# Patient Record
Sex: MALE | Race: WHITE | Employment: FULL TIME | URBAN - METROPOLITAN AREA
[De-identification: names, ages, dates, MRNs, and addresses within clinical notes are randomized per-mention and may not be internally consistent; named-entity substitution may affect disease eponyms.]

---

## 2017-01-06 PROBLEM — M54.2 NECK PAIN: Status: ACTIVE | Noted: 2017-01-06

## 2017-04-13 ENCOUNTER — INITIAL CONSULT (OUTPATIENT)
Dept: NEUROSURGERY | Age: 37
End: 2017-04-13
Payer: COMMERCIAL

## 2017-04-13 VITALS
HEART RATE: 87 BPM | HEIGHT: 75 IN | BODY MASS INDEX: 29.72 KG/M2 | SYSTOLIC BLOOD PRESSURE: 132 MMHG | DIASTOLIC BLOOD PRESSURE: 84 MMHG | WEIGHT: 239 LBS

## 2017-04-13 DIAGNOSIS — M54.12 CERVICAL RADICULOPATHY AT C6: Primary | ICD-10-CM

## 2017-04-13 PROCEDURE — 99243 OFF/OP CNSLTJ NEW/EST LOW 30: CPT | Performed by: NEUROLOGICAL SURGERY

## 2017-04-13 RX ORDER — AMOXICILLIN 875 MG/1
TABLET, COATED ORAL
Refills: 0 | COMMUNITY
Start: 2017-04-08 | End: 2017-06-08 | Stop reason: ALTCHOICE

## 2017-04-13 RX ORDER — CARISOPRODOL 350 MG/1
350 TABLET ORAL 2 TIMES DAILY
COMMUNITY
End: 2017-06-08

## 2017-04-13 RX ORDER — METHYLPREDNISOLONE 4 MG/1
TABLET ORAL
Qty: 1 KIT | Refills: 0 | Status: SHIPPED | OUTPATIENT
Start: 2017-04-13 | End: 2017-04-19

## 2017-04-13 RX ORDER — FAMOTIDINE 20 MG/1
20 TABLET, FILM COATED ORAL 2 TIMES DAILY
Qty: 60 TABLET | Refills: 3 | Status: SHIPPED | OUTPATIENT
Start: 2017-04-13 | End: 2017-11-06

## 2017-04-13 ASSESSMENT — VISUAL ACUITY: VA_NORMAL: 1

## 2017-05-11 ENCOUNTER — OFFICE VISIT (OUTPATIENT)
Dept: NEUROSURGERY | Age: 37
End: 2017-05-11
Payer: COMMERCIAL

## 2017-05-11 VITALS
HEART RATE: 72 BPM | DIASTOLIC BLOOD PRESSURE: 90 MMHG | WEIGHT: 245 LBS | SYSTOLIC BLOOD PRESSURE: 122 MMHG | HEIGHT: 75 IN | BODY MASS INDEX: 30.46 KG/M2

## 2017-05-11 DIAGNOSIS — M54.12 CERVICAL RADICULOPATHY AT C6: Primary | ICD-10-CM

## 2017-05-11 PROCEDURE — 99213 OFFICE O/P EST LOW 20 MIN: CPT | Performed by: NEUROLOGICAL SURGERY

## 2017-05-11 ASSESSMENT — VISUAL ACUITY: VA_NORMAL: 1

## 2017-05-15 ENCOUNTER — HOSPITAL ENCOUNTER (OUTPATIENT)
Dept: PHYSICAL THERAPY | Age: 37
Setting detail: THERAPIES SERIES
Discharge: HOME | End: 2017-05-15
Payer: COMMERCIAL

## 2017-05-15 PROCEDURE — 97162 PT EVAL MOD COMPLEX 30 MIN: CPT

## 2017-05-17 ENCOUNTER — HOSPITAL ENCOUNTER (OUTPATIENT)
Dept: PHYSICAL THERAPY | Age: 37
Setting detail: THERAPIES SERIES
Discharge: HOME | End: 2017-05-17
Payer: COMMERCIAL

## 2017-05-17 PROCEDURE — 97110 THERAPEUTIC EXERCISES: CPT

## 2017-05-17 PROCEDURE — 97140 MANUAL THERAPY 1/> REGIONS: CPT

## 2017-05-17 ASSESSMENT — PAIN SCALES - GENERAL: PAINLEVEL_OUTOF10: 6

## 2017-05-17 ASSESSMENT — PAIN DESCRIPTION - ORIENTATION: ORIENTATION: LEFT

## 2017-05-17 ASSESSMENT — PAIN DESCRIPTION - LOCATION: LOCATION: NECK

## 2017-05-18 ASSESSMENT — PAIN SCALES - GENERAL: PAINLEVEL_OUTOF10: 7

## 2017-05-18 ASSESSMENT — PAIN DESCRIPTION - ORIENTATION: ORIENTATION: LEFT

## 2017-05-18 ASSESSMENT — PAIN DESCRIPTION - LOCATION: LOCATION: NECK

## 2017-05-22 ENCOUNTER — HOSPITAL ENCOUNTER (OUTPATIENT)
Dept: PHYSICAL THERAPY | Age: 37
Setting detail: THERAPIES SERIES
Discharge: HOME | End: 2017-05-22
Payer: COMMERCIAL

## 2017-05-22 PROCEDURE — 97140 MANUAL THERAPY 1/> REGIONS: CPT

## 2017-05-22 PROCEDURE — 97110 THERAPEUTIC EXERCISES: CPT

## 2017-05-22 ASSESSMENT — PAIN SCALES - GENERAL: PAINLEVEL_OUTOF10: 7

## 2017-05-22 ASSESSMENT — PAIN DESCRIPTION - LOCATION: LOCATION: NECK

## 2017-05-22 ASSESSMENT — PAIN DESCRIPTION - ORIENTATION: ORIENTATION: LEFT

## 2017-05-24 ENCOUNTER — HOSPITAL ENCOUNTER (OUTPATIENT)
Dept: PHYSICAL THERAPY | Age: 37
Setting detail: THERAPIES SERIES
Discharge: HOME | End: 2017-05-24
Payer: COMMERCIAL

## 2017-05-24 PROCEDURE — 97140 MANUAL THERAPY 1/> REGIONS: CPT

## 2017-05-24 PROCEDURE — 97110 THERAPEUTIC EXERCISES: CPT

## 2017-05-25 ASSESSMENT — PAIN DESCRIPTION - ORIENTATION: ORIENTATION: LEFT

## 2017-05-25 ASSESSMENT — PAIN DESCRIPTION - LOCATION: LOCATION: NECK

## 2017-05-25 ASSESSMENT — PAIN SCALES - GENERAL: PAINLEVEL_OUTOF10: 7

## 2017-05-31 ENCOUNTER — HOSPITAL ENCOUNTER (OUTPATIENT)
Dept: PHYSICAL THERAPY | Age: 37
Setting detail: THERAPIES SERIES
Discharge: HOME | End: 2017-05-31
Payer: COMMERCIAL

## 2017-06-05 ENCOUNTER — HOSPITAL ENCOUNTER (OUTPATIENT)
Dept: PHYSICAL THERAPY | Age: 37
Setting detail: THERAPIES SERIES
Discharge: HOME | End: 2017-06-05
Payer: COMMERCIAL

## 2017-06-07 ENCOUNTER — HOSPITAL ENCOUNTER (OUTPATIENT)
Dept: PHYSICAL THERAPY | Age: 37
Setting detail: THERAPIES SERIES
Discharge: HOME | End: 2017-06-07
Payer: COMMERCIAL

## 2017-06-07 PROCEDURE — 97110 THERAPEUTIC EXERCISES: CPT

## 2017-06-07 PROCEDURE — 97140 MANUAL THERAPY 1/> REGIONS: CPT

## 2017-06-08 ENCOUNTER — OFFICE VISIT (OUTPATIENT)
Dept: NEUROSURGERY | Age: 37
End: 2017-06-08
Payer: COMMERCIAL

## 2017-06-08 VITALS
DIASTOLIC BLOOD PRESSURE: 90 MMHG | SYSTOLIC BLOOD PRESSURE: 122 MMHG | HEART RATE: 81 BPM | HEIGHT: 75 IN | WEIGHT: 228 LBS | BODY MASS INDEX: 28.35 KG/M2

## 2017-06-08 DIAGNOSIS — M54.12 CERVICAL RADICULOPATHY: Primary | ICD-10-CM

## 2017-06-08 PROCEDURE — 99213 OFFICE O/P EST LOW 20 MIN: CPT | Performed by: NEUROLOGICAL SURGERY

## 2017-06-08 ASSESSMENT — PAIN DESCRIPTION - LOCATION: LOCATION: NECK

## 2017-06-08 ASSESSMENT — PAIN DESCRIPTION - ORIENTATION: ORIENTATION: LEFT

## 2017-06-08 ASSESSMENT — VISUAL ACUITY: VA_NORMAL: 1

## 2017-06-08 ASSESSMENT — PAIN SCALES - GENERAL: PAINLEVEL_OUTOF10: 7

## 2017-11-06 PROBLEM — R19.5 LOOSE STOOLS: Status: ACTIVE | Noted: 2017-11-06

## 2017-11-06 PROBLEM — N50.812 TESTICULAR PAIN, LEFT: Status: ACTIVE | Noted: 2017-11-06

## 2017-12-01 ENCOUNTER — HOSPITAL ENCOUNTER (OUTPATIENT)
Age: 37
Discharge: HOME | End: 2017-12-01
Payer: COMMERCIAL

## 2017-12-01 ENCOUNTER — OFFICE VISIT (OUTPATIENT)
Dept: GASTROENTEROLOGY | Age: 37
End: 2017-12-01
Payer: COMMERCIAL

## 2017-12-01 VITALS
SYSTOLIC BLOOD PRESSURE: 148 MMHG | HEIGHT: 75 IN | HEART RATE: 86 BPM | DIASTOLIC BLOOD PRESSURE: 97 MMHG | WEIGHT: 242 LBS | OXYGEN SATURATION: 96 % | BODY MASS INDEX: 30.09 KG/M2 | TEMPERATURE: 97.3 F

## 2017-12-01 DIAGNOSIS — R19.5 LOOSE STOOLS: ICD-10-CM

## 2017-12-01 DIAGNOSIS — R19.5 LOOSE STOOLS: Primary | ICD-10-CM

## 2017-12-01 LAB
ABSOLUTE EOS #: 0.6 K/UL (ref 0–0.4)
ABSOLUTE IMMATURE GRANULOCYTE: ABNORMAL K/UL (ref 0–0.3)
ABSOLUTE LYMPH #: 3 K/UL (ref 1–4.8)
ABSOLUTE MONO #: 0.7 K/UL (ref 0.1–1.3)
ALBUMIN SERPL-MCNC: 4.8 G/DL (ref 3.5–5.2)
ALBUMIN/GLOBULIN RATIO: ABNORMAL (ref 1–2.5)
ALP BLD-CCNC: 115 U/L (ref 40–129)
ALT SERPL-CCNC: 50 U/L (ref 5–41)
AST SERPL-CCNC: 35 U/L
BASOPHILS # BLD: 1 % (ref 0–2)
BASOPHILS ABSOLUTE: 0.1 K/UL (ref 0–0.2)
BILIRUB SERPL-MCNC: 0.34 MG/DL (ref 0.3–1.2)
BILIRUBIN DIRECT: 0.1 MG/DL
BILIRUBIN, INDIRECT: 0.24 MG/DL (ref 0–1)
DIFFERENTIAL TYPE: ABNORMAL
EOSINOPHILS RELATIVE PERCENT: 6 % (ref 0–4)
GLOBULIN: ABNORMAL G/DL (ref 1.5–3.8)
HCT VFR BLD CALC: 47.3 % (ref 41–53)
HEMOGLOBIN: 16.1 G/DL (ref 13.5–17.5)
IMMATURE GRANULOCYTES: ABNORMAL %
LYMPHOCYTES # BLD: 28 % (ref 24–44)
MCH RBC QN AUTO: 31.7 PG (ref 26–34)
MCHC RBC AUTO-ENTMCNC: 34.1 G/DL (ref 31–37)
MCV RBC AUTO: 92.7 FL (ref 80–100)
MONOCYTES # BLD: 7 % (ref 1–7)
PDW BLD-RTO: 13.9 % (ref 11.5–14.9)
PLATELET # BLD: 278 K/UL (ref 150–450)
PLATELET ESTIMATE: ABNORMAL
PMV BLD AUTO: 7.5 FL (ref 6–12)
RBC # BLD: 5.1 M/UL (ref 4.5–5.9)
RBC # BLD: ABNORMAL 10*6/UL
SEG NEUTROPHILS: 58 % (ref 36–66)
SEGMENTED NEUTROPHILS ABSOLUTE COUNT: 6.4 K/UL (ref 1.3–9.1)
TOTAL PROTEIN: 8.2 G/DL (ref 6.4–8.3)
WBC # BLD: 10.8 K/UL (ref 3.5–11)
WBC # BLD: ABNORMAL 10*3/UL

## 2017-12-01 PROCEDURE — 80076 HEPATIC FUNCTION PANEL: CPT

## 2017-12-01 PROCEDURE — 85025 COMPLETE CBC W/AUTO DIFF WBC: CPT

## 2017-12-01 PROCEDURE — 36415 COLL VENOUS BLD VENIPUNCTURE: CPT

## 2017-12-01 PROCEDURE — 83516 IMMUNOASSAY NONANTIBODY: CPT

## 2017-12-01 PROCEDURE — 99244 OFF/OP CNSLTJ NEW/EST MOD 40: CPT | Performed by: INTERNAL MEDICINE

## 2017-12-01 RX ORDER — POLYETHYLENE GLYCOL 3350 17 G/17G
POWDER, FOR SOLUTION ORAL
Qty: 255 G | Refills: 0 | Status: SHIPPED | OUTPATIENT
Start: 2017-12-01 | End: 2017-12-31

## 2017-12-01 ASSESSMENT — ENCOUNTER SYMPTOMS
COUGH: 0
BACK PAIN: 1
SHORTNESS OF BREATH: 0
WHEEZING: 0
ANAL BLEEDING: 1
ABDOMINAL PAIN: 0
DIARRHEA: 1
NAUSEA: 0
BLOOD IN STOOL: 0
SINUS PAIN: 1
RESPIRATORY NEGATIVE: 1
CONSTIPATION: 0
ABDOMINAL DISTENTION: 0
RECTAL PAIN: 0
TROUBLE SWALLOWING: 0
VOMITING: 0
EYES NEGATIVE: 1

## 2017-12-01 NOTE — PROGRESS NOTES
Subjective:      Patient ID: Rubi Laurent is a 40 y.o. male. HPI  Dr. Doris Romero MD our mutual patient Rubi Laurent was seen  for   1. Loose stools     . 2009 eoe  Loose bm6 months  Am  Rest day  No problemds . Gurgling. No bloating  Cramps  ? Blood no wt loss    Patient seen along with his fiancée. Patient states that he is having severe rectal pain, itching and hematochezia in the last 6 months. Noticing hematochezia intermittently. Usually with bowel movements. Has 2-3 liquid stools in the morning. Rest of the day he does not have bowel movements. He does not strain at bowel movements. No constipation. He also has abdominal gurgling sounds. Denies bloating. Has generalized cramps. Has a good appetite and there is no weight loss. He does have chronic GERD. Also he is having intermittent dysphagia with solids. However no symptom of complete obstruction of the esophagus recently. He denies taking NSAIDs. No history of anticoagulation therapy. This patient was seen by me last time in 2009. At that time he was diagnosed to have eosinophilic esophagitis and advised therapy with fluticasone and also allergy consultation. Patient did not follow this advice. He did not return for follow-up either. Past Medical, Family, and Social History reviewed and does contribute to the patient presenting condition. patient\"s PMH/PSH,SH,PSYCH hx, MEDs, ALLERGIES, and ROS was all reviewed and updated ion the appropriate sections    Review of Systems   Constitutional: Negative. Negative for fatigue. HENT: Positive for postnasal drip and sinus pain. Negative for congestion and trouble swallowing. Eyes: Negative. Negative for visual disturbance. Respiratory: Negative. Negative for cough, shortness of breath and wheezing. Cardiovascular: Negative. Negative for chest pain, palpitations and leg swelling. Gastrointestinal: Positive for anal bleeding and diarrhea.  Negative for

## 2017-12-04 LAB — TISSUE TRANSGLUTAMINASE IGA: 0.6 U/ML

## 2017-12-08 ENCOUNTER — HOSPITAL ENCOUNTER (OUTPATIENT)
Age: 37
Setting detail: OUTPATIENT SURGERY
Discharge: HOME | End: 2017-12-08
Attending: INTERNAL MEDICINE | Admitting: INTERNAL MEDICINE
Payer: COMMERCIAL

## 2017-12-08 VITALS
HEIGHT: 75 IN | SYSTOLIC BLOOD PRESSURE: 128 MMHG | TEMPERATURE: 97.5 F | DIASTOLIC BLOOD PRESSURE: 68 MMHG | HEART RATE: 79 BPM | BODY MASS INDEX: 28.35 KG/M2 | RESPIRATION RATE: 16 BRPM | OXYGEN SATURATION: 98 % | WEIGHT: 228 LBS

## 2017-12-08 PROCEDURE — 2580000003 HC RX 258: Performed by: INTERNAL MEDICINE

## 2017-12-08 PROCEDURE — 6360000002 HC RX W HCPCS: Performed by: INTERNAL MEDICINE

## 2017-12-08 PROCEDURE — 7100000011 HC PHASE II RECOVERY - ADDTL 15 MIN: Performed by: INTERNAL MEDICINE

## 2017-12-08 PROCEDURE — 88305 TISSUE EXAM BY PATHOLOGIST: CPT

## 2017-12-08 PROCEDURE — 3609010300 HC COLONOSCOPY W/BIOPSY SINGLE/MULTIPLE: Performed by: INTERNAL MEDICINE

## 2017-12-08 PROCEDURE — 99152 MOD SED SAME PHYS/QHP 5/>YRS: CPT | Performed by: INTERNAL MEDICINE

## 2017-12-08 PROCEDURE — 99153 MOD SED SAME PHYS/QHP EA: CPT | Performed by: INTERNAL MEDICINE

## 2017-12-08 PROCEDURE — 6370000000 HC RX 637 (ALT 250 FOR IP): Performed by: INTERNAL MEDICINE

## 2017-12-08 PROCEDURE — 7100000010 HC PHASE II RECOVERY - FIRST 15 MIN: Performed by: INTERNAL MEDICINE

## 2017-12-08 PROCEDURE — 3609012400 HC EGD TRANSORAL BIOPSY SINGLE/MULTIPLE: Performed by: INTERNAL MEDICINE

## 2017-12-08 RX ORDER — ONDANSETRON 2 MG/ML
4 INJECTION INTRAMUSCULAR; INTRAVENOUS ONCE
Status: COMPLETED | OUTPATIENT
Start: 2017-12-08 | End: 2017-12-08

## 2017-12-08 RX ORDER — MIDAZOLAM HYDROCHLORIDE 1 MG/ML
INJECTION INTRAMUSCULAR; INTRAVENOUS PRN
Status: DISCONTINUED | OUTPATIENT
Start: 2017-12-08 | End: 2017-12-08 | Stop reason: HOSPADM

## 2017-12-08 RX ORDER — SODIUM CHLORIDE 9 MG/ML
INJECTION, SOLUTION INTRAVENOUS CONTINUOUS
Status: DISCONTINUED | OUTPATIENT
Start: 2017-12-08 | End: 2017-12-08 | Stop reason: HOSPADM

## 2017-12-08 RX ORDER — FENTANYL CITRATE 50 UG/ML
INJECTION, SOLUTION INTRAMUSCULAR; INTRAVENOUS PRN
Status: DISCONTINUED | OUTPATIENT
Start: 2017-12-08 | End: 2017-12-08 | Stop reason: HOSPADM

## 2017-12-08 RX ADMIN — ONDANSETRON 4 MG: 2 INJECTION INTRAMUSCULAR; INTRAVENOUS at 13:25

## 2017-12-08 RX ADMIN — SODIUM CHLORIDE: 9 INJECTION, SOLUTION INTRAVENOUS at 10:05

## 2017-12-08 RX ADMIN — SODIUM CHLORIDE: 9 INJECTION, SOLUTION INTRAVENOUS at 13:20

## 2017-12-08 ASSESSMENT — PAIN - FUNCTIONAL ASSESSMENT: PAIN_FUNCTIONAL_ASSESSMENT: 0-10

## 2017-12-08 ASSESSMENT — PAIN DESCRIPTION - DESCRIPTORS: DESCRIPTORS: DULL;ACHING

## 2017-12-08 ASSESSMENT — PAIN SCALES - GENERAL: PAINLEVEL_OUTOF10: 0

## 2017-12-08 NOTE — OP NOTE
ESOPHAGOGASTRODUODENOSCOPY   ( EGD )  DATE OF PROCEDURE: 12/8/2017     SURGEON: Kim Mcneil MD    ASSISTANT: None    PREOPERATIVE DIAGNOSIS: Chronic GERD, intermittent dysphagia for solids. Procedure performed to evaluate esophageal pathology    POSTOPERATIVE DIAGNOSIS: Probable eosinophilic esophagitis no stricture seen    OPERATION: Upper GI endoscopy with Biopsy    ANESTHESIA: Moderate Sedation     ESTIMATED BLOOD LOSS: None    COMPLICATIONS: None. SPECIMENS:  Was Obtained: Esophagus    HISTORY: The patient is a 40y.o. year old male with history of above preop diagnosis. I recommended esophagogastroduodenoscopy with possible biopsy and I explained the risk, benefits, expected outcome, and alternatives to the procedure. Risks included but are not limited to bleeding, infection, respiratory distress, hypotension, and perforation of the esophagus, stomach, or duodenum. Patient understands and is in agreement. PROCEDURE: The patient was given IV conscious sedation. The patient's SPO2 remained above 90% throughout the procedure. Cetacaine spray given. Patient placed in left lateral position. Olympus  videogastroscope was inserted orally under vision into the esophagus without difficulty and advanced into the stomach then through the pylorus up to the second part of duodenum. Findings:    Retropharyngeal area was grossly normal appearing    Esophagus: abnormal: Concentric rings, long furrows seen. Some of these findings are suggestive of eosinophilic esophagitis. Multiple biopsies taken from the mid and upper esophagus. This is to evaluate possible eosinophilic esophagitis    Stomach:    Fundus and Cardia Examined in Retroflexed View: normal    Body: normal    Antrum: normal    Duodenum:     Descending: normal    Bulb: normal    While withdrawing the scope the above findings were verified and the scope was removed. The patient tolerated the procedure well. Recommendations/Plan:   1. F/U Biopsies  2. F/U In Office as instructed  3.  Discussed with the family    Electronically signed by Digna Styles MD  on 12/8/2017 at 12:10 PM

## 2017-12-08 NOTE — H&P
01/06/2017    Rectal bleeding 12/13/2016    Shoulder pain, left 06/30/2016    Anxiety 03/03/2014           CHILANGO MCCARTHY PA-C on 12/8/2017 at 10:36 AM

## 2017-12-11 LAB — SURGICAL PATHOLOGY REPORT: NORMAL

## 2018-01-17 DIAGNOSIS — R19.5 LOOSE STOOLS: ICD-10-CM

## 2018-01-18 PROBLEM — I10 ESSENTIAL HYPERTENSION: Status: ACTIVE | Noted: 2018-01-18

## 2018-02-08 ENCOUNTER — TELEPHONE (OUTPATIENT)
Dept: GASTROENTEROLOGY | Age: 38
End: 2018-02-08

## 2018-02-08 ENCOUNTER — OFFICE VISIT (OUTPATIENT)
Dept: GASTROENTEROLOGY | Age: 38
End: 2018-02-08
Payer: COMMERCIAL

## 2018-02-08 VITALS
HEIGHT: 75 IN | DIASTOLIC BLOOD PRESSURE: 88 MMHG | WEIGHT: 245.4 LBS | OXYGEN SATURATION: 95 % | SYSTOLIC BLOOD PRESSURE: 138 MMHG | BODY MASS INDEX: 30.51 KG/M2 | HEART RATE: 79 BPM

## 2018-02-08 DIAGNOSIS — K20.0 EOSINOPHILIC ESOPHAGITIS: ICD-10-CM

## 2018-02-08 DIAGNOSIS — R74.01 ELEVATED ALT MEASUREMENT: ICD-10-CM

## 2018-02-08 DIAGNOSIS — R13.14 PHARYNGOESOPHAGEAL DYSPHAGIA: ICD-10-CM

## 2018-02-08 PROCEDURE — 99214 OFFICE O/P EST MOD 30 MIN: CPT | Performed by: INTERNAL MEDICINE

## 2018-02-08 ASSESSMENT — ENCOUNTER SYMPTOMS
ABDOMINAL PAIN: 0
VOMITING: 0
GASTROINTESTINAL NEGATIVE: 1
CONSTIPATION: 0
SINUS PAIN: 1
TROUBLE SWALLOWING: 1
NAUSEA: 0
RHINORRHEA: 0
SINUS PRESSURE: 0
SHORTNESS OF BREATH: 0
EYES NEGATIVE: 1
COUGH: 0
SORE THROAT: 0
ABDOMINAL DISTENTION: 0
WHEEZING: 0
ANAL BLEEDING: 0
VOICE CHANGE: 0
BACK PAIN: 1
BLOOD IN STOOL: 0
RECTAL PAIN: 0
RESPIRATORY NEGATIVE: 1
FACIAL SWELLING: 0
DIARRHEA: 0

## 2018-02-08 NOTE — PROGRESS NOTES
Respiratory: Negative. Negative for cough, shortness of breath and wheezing. Cardiovascular: Negative. Negative for chest pain, palpitations and leg swelling. Gastrointestinal: Negative. Negative for abdominal distention, abdominal pain, anal bleeding, blood in stool, constipation, diarrhea, nausea, rectal pain and vomiting. Endocrine: Negative. Negative for polydipsia, polyphagia and polyuria. Genitourinary: Negative. Negative for difficulty urinating, dysuria, frequency and hematuria. Musculoskeletal: Positive for back pain and neck pain. Negative for arthralgias, gait problem, myalgias and neck stiffness. Allergic/Immunologic: Positive for food allergies (cashews and macadamia nuts). Negative for environmental allergies and immunocompromised state. Neurological: Positive for dizziness. Negative for tremors, seizures, syncope, facial asymmetry, speech difficulty, weakness, light-headedness, numbness and headaches. Hematological: Negative. Negative for adenopathy. Does not bruise/bleed easily. Psychiatric/Behavioral: Positive for sleep disturbance. The patient is nervous/anxious. Objective:   Physical Exam   Constitutional: He is oriented to person, place, and time. He appears well-developed and well-nourished. No distress. HENT:   Head: Normocephalic and atraumatic. Mouth/Throat: No oropharyngeal exudate. Eyes: Conjunctivae are normal. Pupils are equal, round, and reactive to light. No scleral icterus. Neck: Normal range of motion. Neck supple. No tracheal deviation present. No thyromegaly present. Cardiovascular: Normal rate, regular rhythm, normal heart sounds and intact distal pulses. No murmur heard. Pulmonary/Chest: Effort normal and breath sounds normal. No respiratory distress. He has no wheezes. He has no rales. Abdominal: Soft. Bowel sounds are normal. He exhibits no distension, no ascites and no mass. There is no hepatomegaly. There is no tenderness. There is no guarding. No hernia. No peripheral signs of ch. Liver disease   Genitourinary: Rectum normal.   Musculoskeletal: He exhibits no edema. Lymphadenopathy:     He has no cervical adenopathy. Neurological: He is alert and oriented to person, place, and time. No cranial nerve deficit. Skin: Skin is warm and dry. No rash noted. No erythema. Psychiatric: Thought content normal.   Nursing note and vitals reviewed. Assessment:      1. Eosinophilic esophagitis  LAURIE Shields MD, Yamilex Mahan MD   2. Pharyngoesophageal dysphagia     3. Elevated ALT measurement             Plan:      Patient is explained regarding the procedure findings and histology results. Given his a symptoms he needs further evaluation regarding food allergies. He agreed to see allergist and this is arranged. Following the allergy consultation we'll treat him for eosinophilic esophagitis. Antral then patient is advised to make food into small pieces, chew well and swallow. After discussion patient understood and agreed. As far as minimally elevated transaminases are concerned this needs to be followed at a later time basing repeating liver battery in 6 months.

## 2018-02-14 NOTE — TELEPHONE ENCOUNTER
Call Dr Mario Kapadia office speak to [facility], she states that patient has an appointment to see Dr Rae Rizo on 2/27/2018.

## 2018-02-27 ENCOUNTER — HOSPITAL ENCOUNTER (OUTPATIENT)
Age: 38
Discharge: HOME | End: 2018-02-27
Payer: COMMERCIAL

## 2018-02-27 LAB
ABSOLUTE EOS #: 0.6 K/UL (ref 0–0.4)
ABSOLUTE IMMATURE GRANULOCYTE: ABNORMAL K/UL (ref 0–0.3)
ABSOLUTE LYMPH #: 3 K/UL (ref 1–4.8)
ABSOLUTE MONO #: 0.7 K/UL (ref 0.1–1.3)
BASOPHILS # BLD: 0 % (ref 0–2)
BASOPHILS ABSOLUTE: 0 K/UL (ref 0–0.2)
DIFFERENTIAL TYPE: ABNORMAL
EOSINOPHILS ABSOLUTE: 550 /UL (ref 0–450)
EOSINOPHILS RELATIVE PERCENT: 5 %
EOSINOPHILS RELATIVE PERCENT: 5 % (ref 0–4)
HCT VFR BLD CALC: 46.4 % (ref 41–53)
HEMOGLOBIN: 15.9 G/DL (ref 13.5–17.5)
IGE: 62 IU/ML
IMMATURE GRANULOCYTES: ABNORMAL %
LYMPHOCYTES # BLD: 27 % (ref 24–44)
MCH RBC QN AUTO: 31.6 PG (ref 26–34)
MCHC RBC AUTO-ENTMCNC: 34.2 G/DL (ref 31–37)
MCV RBC AUTO: 92.6 FL (ref 80–100)
MONOCYTES # BLD: 6 % (ref 1–7)
NRBC AUTOMATED: ABNORMAL PER 100 WBC
PDW BLD-RTO: 13.6 % (ref 11.5–14.9)
PLATELET # BLD: 284 K/UL (ref 150–450)
PLATELET ESTIMATE: ABNORMAL
PMV BLD AUTO: 7 FL (ref 6–12)
RBC # BLD: 5.01 M/UL (ref 4.5–5.9)
RBC # BLD: ABNORMAL 10*6/UL
SEG NEUTROPHILS: 62 % (ref 36–66)
SEGMENTED NEUTROPHILS ABSOLUTE COUNT: 6.7 K/UL (ref 1.3–9.1)
WBC # BLD: 11 K/UL (ref 3.5–11)
WBC # BLD: 11 K/UL (ref 3.5–11)
WBC # BLD: ABNORMAL 10*3/UL

## 2018-02-27 PROCEDURE — 82785 ASSAY OF IGE: CPT

## 2018-02-27 PROCEDURE — 85025 COMPLETE CBC W/AUTO DIFF WBC: CPT

## 2018-02-27 PROCEDURE — 86003 ALLG SPEC IGE CRUDE XTRC EA: CPT

## 2018-02-27 PROCEDURE — 85048 AUTOMATED LEUKOCYTE COUNT: CPT

## 2018-02-27 PROCEDURE — 36415 COLL VENOUS BLD VENIPUNCTURE: CPT

## 2018-03-01 LAB
ALLERGEN ALMOND IGE: <0.34 KU/L (ref 0–0.34)
ALLERGEN CASHEW IGE: <0.34 KU/L (ref 0–0.34)
ALLERGEN CHICKEN IGE: <0.34 KU/L (ref 0–0.34)
ALLERGEN HAZELNUT: <0.34 KU/L (ref 0–0.34)
ALLERGEN PEANUT (F13) IGE: <0.34 KU/L (ref 0–0.34)
ALLERGEN PECAN NUT IGE: <0.34 KU/L (ref 0–0.34)
ALLERGEN PISTACHIO IGE: <0.34 KU/L (ref 0–0.34)
ALLERGEN SOYBEAN IGE: <0.34 KU/L (ref 0–0.34)
ALLERGEN WALNUT IGE: <0.34 KU/L (ref 0–0.34)
BRAZIL NUT IGE CLASS: <0.34 KU/L (ref 0–0.34)

## 2018-03-29 ENCOUNTER — OFFICE VISIT (OUTPATIENT)
Dept: GASTROENTEROLOGY | Age: 38
End: 2018-03-29
Payer: COMMERCIAL

## 2018-03-29 VITALS
SYSTOLIC BLOOD PRESSURE: 130 MMHG | BODY MASS INDEX: 31.36 KG/M2 | WEIGHT: 252.2 LBS | OXYGEN SATURATION: 98 % | DIASTOLIC BLOOD PRESSURE: 89 MMHG | HEIGHT: 75 IN | HEART RATE: 79 BPM

## 2018-03-29 DIAGNOSIS — R13.14 PHARYNGOESOPHAGEAL DYSPHAGIA: Primary | ICD-10-CM

## 2018-03-29 DIAGNOSIS — K20.0 EOSINOPHILIC ESOPHAGITIS: ICD-10-CM

## 2018-03-29 PROCEDURE — 99214 OFFICE O/P EST MOD 30 MIN: CPT | Performed by: INTERNAL MEDICINE

## 2018-03-29 RX ORDER — FLUTICASONE PROPIONATE 220 UG/1
AEROSOL, METERED RESPIRATORY (INHALATION)
Qty: 1 INHALER | Refills: 3 | Status: SHIPPED | OUTPATIENT
Start: 2018-03-29 | End: 2018-06-28 | Stop reason: ALTCHOICE

## 2018-03-29 RX ORDER — OMEPRAZOLE 40 MG/1
40 CAPSULE, DELAYED RELEASE ORAL DAILY
Qty: 30 CAPSULE | Refills: 3 | Status: SHIPPED | OUTPATIENT
Start: 2018-03-29 | End: 2018-10-16 | Stop reason: SDUPTHER

## 2018-03-29 RX ORDER — OMEPRAZOLE 40 MG/1
1 CAPSULE, DELAYED RELEASE ORAL DAILY
Refills: 0 | COMMUNITY
Start: 2018-02-27 | End: 2018-03-29 | Stop reason: SDUPTHER

## 2018-03-29 RX ORDER — FLUTICASONE PROPIONATE 220 UG/1
2 AEROSOL, METERED RESPIRATORY (INHALATION) DAILY
Refills: 0 | COMMUNITY
Start: 2018-02-27 | End: 2018-06-28 | Stop reason: ALTCHOICE

## 2018-03-29 RX ORDER — FLUTICASONE PROPIONATE 50 MCG
2 SPRAY, SUSPENSION (ML) NASAL NIGHTLY
Refills: 0 | COMMUNITY
Start: 2018-02-27 | End: 2019-09-07

## 2018-03-29 ASSESSMENT — ENCOUNTER SYMPTOMS
FACIAL SWELLING: 0
ANAL BLEEDING: 0
DIARRHEA: 0
SHORTNESS OF BREATH: 0
RECTAL PAIN: 0
ABDOMINAL PAIN: 0
RESPIRATORY NEGATIVE: 1
SINUS PAIN: 1
WHEEZING: 0
GASTROINTESTINAL NEGATIVE: 1
BACK PAIN: 1
VOICE CHANGE: 0
SORE THROAT: 0
SINUS PRESSURE: 0
COUGH: 0
CONSTIPATION: 0
RHINORRHEA: 0
TROUBLE SWALLOWING: 1
VOMITING: 0
ABDOMINAL DISTENTION: 0
BLOOD IN STOOL: 0
EYES NEGATIVE: 1
NAUSEA: 0

## 2018-06-28 PROBLEM — R53.83 TIREDNESS: Status: ACTIVE | Noted: 2018-06-28

## 2018-06-29 ENCOUNTER — HOSPITAL ENCOUNTER (OUTPATIENT)
Age: 38
Setting detail: SPECIMEN
Discharge: HOME | End: 2018-06-29
Payer: COMMERCIAL

## 2018-07-02 DIAGNOSIS — R53.83 TIREDNESS: ICD-10-CM

## 2018-07-06 ENCOUNTER — HOSPITAL ENCOUNTER (OUTPATIENT)
Age: 38
Setting detail: OUTPATIENT SURGERY
Discharge: HOME | End: 2018-07-06
Attending: INTERNAL MEDICINE | Admitting: INTERNAL MEDICINE
Payer: COMMERCIAL

## 2018-07-06 VITALS
TEMPERATURE: 97.5 F | HEART RATE: 77 BPM | RESPIRATION RATE: 17 BRPM | HEIGHT: 75 IN | DIASTOLIC BLOOD PRESSURE: 87 MMHG | WEIGHT: 240 LBS | OXYGEN SATURATION: 96 % | BODY MASS INDEX: 29.84 KG/M2 | SYSTOLIC BLOOD PRESSURE: 134 MMHG

## 2018-07-06 PROCEDURE — 3609012400 HC EGD TRANSORAL BIOPSY SINGLE/MULTIPLE: Performed by: INTERNAL MEDICINE

## 2018-07-06 PROCEDURE — 6370000000 HC RX 637 (ALT 250 FOR IP): Performed by: INTERNAL MEDICINE

## 2018-07-06 PROCEDURE — 2580000003 HC RX 258: Performed by: INTERNAL MEDICINE

## 2018-07-06 PROCEDURE — 6360000002 HC RX W HCPCS: Performed by: INTERNAL MEDICINE

## 2018-07-06 PROCEDURE — 88305 TISSUE EXAM BY PATHOLOGIST: CPT

## 2018-07-06 PROCEDURE — 7100000011 HC PHASE II RECOVERY - ADDTL 15 MIN: Performed by: INTERNAL MEDICINE

## 2018-07-06 PROCEDURE — 7100000010 HC PHASE II RECOVERY - FIRST 15 MIN: Performed by: INTERNAL MEDICINE

## 2018-07-06 PROCEDURE — 99152 MOD SED SAME PHYS/QHP 5/>YRS: CPT | Performed by: INTERNAL MEDICINE

## 2018-07-06 RX ORDER — FENTANYL CITRATE 50 UG/ML
INJECTION, SOLUTION INTRAMUSCULAR; INTRAVENOUS PRN
Status: DISCONTINUED | OUTPATIENT
Start: 2018-07-06 | End: 2018-07-06 | Stop reason: HOSPADM

## 2018-07-06 RX ORDER — SODIUM CHLORIDE 9 MG/ML
INJECTION, SOLUTION INTRAVENOUS CONTINUOUS
Status: DISCONTINUED | OUTPATIENT
Start: 2018-07-06 | End: 2018-07-06 | Stop reason: HOSPADM

## 2018-07-06 RX ORDER — MIDAZOLAM HYDROCHLORIDE 1 MG/ML
INJECTION INTRAMUSCULAR; INTRAVENOUS PRN
Status: DISCONTINUED | OUTPATIENT
Start: 2018-07-06 | End: 2018-07-06 | Stop reason: HOSPADM

## 2018-07-06 RX ADMIN — SODIUM CHLORIDE: 9 INJECTION, SOLUTION INTRAVENOUS at 10:39

## 2018-07-06 ASSESSMENT — PAIN - FUNCTIONAL ASSESSMENT: PAIN_FUNCTIONAL_ASSESSMENT: 0-10

## 2018-07-06 ASSESSMENT — PAIN SCALES - GENERAL: PAINLEVEL_OUTOF10: 0

## 2018-07-06 NOTE — H&P
HISTORY and Annmarie Ghosh 5747       NAME:  Mellie Phoenix  MRN: [id number]   YOB: 1980   Date: 7/6/2018   Age: 45 y.o. Gender: male       COMPLAINT AND PRESENT HISTORY:     Mellie Phoenix is 45 y.o.,   male, having an EGD. Eosinophilic esophagitis, Dysphagia. Patient denies any other GI symptoms. No nausea / vomiting, No diarrhea or constipation. No abdominal pains or cramping, No heartburn, no changes in the color of the stools. No fever or chills. PAST MEDICAL HISTORY     Past Medical History:   Diagnosis Date    History of kidney stones     Hypertension          SURGICAL HISTORY       Past Surgical History:   Procedure Laterality Date    COLONOSCOPY  12/08/2017    No lesions seen up to the cecum and a couple of inches of ileum. Random biopsies taken. , pathology--LARGE INTESTINAL TYPE MUCOSA DEMONSTRATING NO SIGNIFICANT     HERNIA REPAIR Left 07/27/2016    Dr Risa Ferguson COLONOSCOPY W/BIOPSY SINGLE/MULTIPLE N/A 12/8/2017    COLONOSCOPY WITH BIOPSY performed by Ted Deleon MD at [address] [place] [address] EGD TRANSORAL BIOPSY SINGLE/MULTIPLE N/A 12/8/2017    EGD BIOPSY performed by Ted Deleon MD at [facility]  05/26/2009    pathology-focal active esophagitis, showing prominent eosinophilia-up to 50 esonophils per high power field    UPPER GASTROINTESTINAL ENDOSCOPY  12/08/2017    Probable eosinophilic esophagitis no stricture seen, pathology-SQUAMOUS EPITHELIUM WITH EOSINOPHILIA (SEE COMMENT)          SOCIAL HISTORY       Social History     Social History    Marital status:      Spouse name: N/A    Number of children: N/A    Years of education: N/A     Social History Main Topics    Smoking status: Never Smoker    Smokeless tobacco: Current User     Types: Chew    Alcohol use No    Drug use: No    Sexual activity: Not Asked     Other Topics Concern    None     Social History Narrative  None           REVIEW OF SYSTEMS      Allergies   Allergen Reactions    Naproxen Nausea Only       No current facility-administered medications on file prior to encounter. Current Outpatient Prescriptions on File Prior to Encounter   Medication Sig Dispense Refill    fluticasone (FLONASE) 50 MCG/ACT nasal spray 2 sprays nightly  0    RA ALLERGY RELIEF 10 MG tablet 1 tablet daily  0    omeprazole (PRILOSEC) 40 MG delayed release capsule Take 1 capsule by mouth daily 30 capsule 3    losartan (COZAAR) 25 MG tablet Take 1 tablet by mouth daily 30 tablet 5       Negative except for what is mentioned in the HPI. GENERAL PHYSICAL EXAM     Vitals: BP (!) 144/90   Pulse 70   Temp 97.9 °F (36.6 °C) (Oral)   Resp 16   Ht 6' 3\" (1.905 m)   Wt 240 lb (108.9 kg)   SpO2 96%   BMI 30.00 kg/m²  Body mass index is 30 kg/m². GENERAL APPEARANCE:   Alondra Lynn is 45 y.o.,  male, mildly obese, nourished, conscious, alert. Does not appear to be distress or pain at this time. SKIN:  Warm, dry, no cyanosis or jaundice. HEAD:  Normocephalic, atraumatic, no swelling or tenderness. EYES:  Pupils equal, reactive to light. EARS:  No discharge, no marked hearing loss. NOSE:  No rhinorrhea, epistaxis or septal deformity. THROAT:  Not congested. No ulceration bleeding or discharge. NECK:  No stiffness, trachea central.  No palpable masses or L.N.                 CHEST:  Symmetrical and equal on expansion. HEART:  RRR S1 > S2. No audible murmurs or gallops. LUNGS:  Equal on expansion, normal breath sounds. No adventitious sounds. ABDOMEN:  Obese. Soft on palpation. No localized tenderness. No guarding or rigidity. No palpable organomegaly. LYMPHATICS:  No palpable cervical lymphadenopathy.      LOCOMOTOR, BACK AND SPINE:  No tenderness or deformities. EXTREMITIES:  Symmetrical, no pedal edema. Aminahs sign negative. No discoloration or ulcerations. NEUROLOGIC:  The patient is conscious, alert, oriented, No apparent focal sensory or motor deficits. PROVISIONAL DIAGNOSES / SURGERY:      EGD. Eosinophilic esophagitis, Dysphagia. Patient Active Problem List    Diagnosis Date Noted    Tiredness 48/45/0067    Eosinophilic esophagitis 14/71/3490    Pharyngoesophageal dysphagia 02/08/2018    Elevated ALT measurement 02/08/2018    Essential hypertension 01/18/2018    Loose stools 11/06/2017    Testicular pain, left 11/06/2017    Neck pain 01/06/2017    Rectal bleeding 12/13/2016    Shoulder pain, left 06/30/2016    Anxiety 03/03/2014         ASA Classification:  Class 2 - A normal healthy patient with mild systemic disease    Mallampati Airway Assessment:  Mallampati Class II - (soft palate, fauces & uvula are visible)    I have examined this patient and reviewed the history and physical, vital signs, current medications and review of systems.     Electronically signed by Brianna Valdivia MD on 7/6/2018 at 11:28 AM      Berto Campos PA-C on 7/6/2018 at 10:45 AM

## 2018-07-06 NOTE — OP NOTE
the above findings were verified and the scope was removed. The patient has tolerated the procedure and conscious sedation without unusual events. Recommendations/Plan:   1. F/U Biopsies  2. F/U In Office as instructed  3.  Discussed with the family                   Electronically signed by Breanna Gipson MD  on 7/6/2018 at 11:46 AM

## 2018-07-09 LAB — SURGICAL PATHOLOGY REPORT: NORMAL

## 2018-07-10 ENCOUNTER — HOSPITAL ENCOUNTER (OUTPATIENT)
Age: 38
Setting detail: SPECIMEN
Discharge: HOME | End: 2018-07-10
Payer: COMMERCIAL

## 2018-07-10 LAB
ALBUMIN SERPL-MCNC: 4.2 G/DL (ref 3.5–5.2)
ALBUMIN/GLOBULIN RATIO: 1.6 (ref 1–2.5)
ALP BLD-CCNC: 137 U/L (ref 40–129)
ALT SERPL-CCNC: 25 U/L (ref 5–41)
ANION GAP SERPL CALCULATED.3IONS-SCNC: 15 MMOL/L (ref 9–17)
AST SERPL-CCNC: 18 U/L
BILIRUB SERPL-MCNC: 0.3 MG/DL (ref 0.3–1.2)
BILIRUBIN DIRECT: 0.08 MG/DL
BILIRUBIN, INDIRECT: 0.22 MG/DL (ref 0–1)
BUN BLDV-MCNC: 12 MG/DL (ref 6–20)
CALCIUM SERPL-MCNC: 9 MG/DL (ref 8.6–10.4)
CHLORIDE BLD-SCNC: 105 MMOL/L (ref 98–107)
CO2: 24 MMOL/L (ref 20–31)
CREAT SERPL-MCNC: 0.81 MG/DL (ref 0.7–1.2)
GFR AFRICAN AMERICAN: >60 ML/MIN
GFR NON-AFRICAN AMERICAN: >60 ML/MIN
GFR SERPL CREATININE-BSD FRML MDRD: ABNORMAL ML/MIN/{1.73_M2}
GFR SERPL CREATININE-BSD FRML MDRD: ABNORMAL ML/MIN/{1.73_M2}
GLUCOSE BLD-MCNC: 89 MG/DL (ref 70–99)
HCT VFR BLD CALC: 44.3 % (ref 40.7–50.3)
HEMOGLOBIN: 14.8 G/DL (ref 13–17)
MCH RBC QN AUTO: 31 PG (ref 25.2–33.5)
MCHC RBC AUTO-ENTMCNC: 33.4 G/DL (ref 28.4–34.8)
MCV RBC AUTO: 92.7 FL (ref 82.6–102.9)
MONONUCLEOSIS SCREEN: NEGATIVE
NRBC AUTOMATED: 0 PER 100 WBC
PDW BLD-RTO: 12.9 % (ref 11.8–14.4)
PLATELET # BLD: 253 K/UL (ref 138–453)
PMV BLD AUTO: 10.1 FL (ref 8.1–13.5)
POTASSIUM SERPL-SCNC: 4 MMOL/L (ref 3.7–5.3)
RBC # BLD: 4.78 M/UL (ref 4.21–5.77)
SODIUM BLD-SCNC: 144 MMOL/L (ref 135–144)
THYROXINE, FREE: 1.15 NG/DL (ref 0.93–1.7)
TOTAL PROTEIN: 6.9 G/DL (ref 6.4–8.3)
TSH SERPL DL<=0.05 MIU/L-ACNC: 1.62 MIU/L (ref 0.3–5)
VITAMIN B-12: 523 PG/ML (ref 232–1245)
VITAMIN D 25-HYDROXY: 15 NG/ML (ref 30–100)
WBC # BLD: 12.1 K/UL (ref 3.5–11.3)

## 2018-07-13 LAB — EBV NUCLEAR AG AB: 109 U/ML

## 2018-07-25 ENCOUNTER — HOSPITAL ENCOUNTER (OUTPATIENT)
Age: 38
Discharge: HOME | End: 2018-07-25
Payer: COMMERCIAL

## 2018-07-25 LAB
EKG ATRIAL RATE: 86 BPM
EKG P AXIS: 51 DEGREES
EKG P-R INTERVAL: 148 MS
EKG Q-T INTERVAL: 342 MS
EKG QRS DURATION: 90 MS
EKG QTC CALCULATION (BAZETT): 409 MS
EKG R AXIS: -7 DEGREES
EKG T AXIS: -9 DEGREES
EKG VENTRICULAR RATE: 86 BPM

## 2018-07-25 PROCEDURE — 93005 ELECTROCARDIOGRAM TRACING: CPT

## 2018-07-26 ENCOUNTER — OFFICE VISIT (OUTPATIENT)
Dept: GASTROENTEROLOGY | Age: 38
End: 2018-07-26
Payer: COMMERCIAL

## 2018-07-26 VITALS
OXYGEN SATURATION: 96 % | HEART RATE: 83 BPM | DIASTOLIC BLOOD PRESSURE: 88 MMHG | WEIGHT: 247 LBS | BODY MASS INDEX: 30.71 KG/M2 | HEIGHT: 75 IN | SYSTOLIC BLOOD PRESSURE: 126 MMHG

## 2018-07-26 DIAGNOSIS — R13.14 PHARYNGOESOPHAGEAL DYSPHAGIA: Primary | ICD-10-CM

## 2018-07-26 DIAGNOSIS — K20.0 EOSINOPHILIC ESOPHAGITIS: ICD-10-CM

## 2018-07-26 PROCEDURE — 99214 OFFICE O/P EST MOD 30 MIN: CPT | Performed by: INTERNAL MEDICINE

## 2018-07-26 RX ORDER — FLUTICASONE PROPIONATE 220 UG/1
AEROSOL, METERED RESPIRATORY (INHALATION)
Qty: 1 INHALER | Refills: 3 | Status: SHIPPED | OUTPATIENT
Start: 2018-07-26 | End: 2019-09-07

## 2018-07-26 ASSESSMENT — ENCOUNTER SYMPTOMS
TROUBLE SWALLOWING: 1
COUGH: 0
RECTAL PAIN: 0
BLOOD IN STOOL: 0
EYES NEGATIVE: 1
WHEEZING: 0
NAUSEA: 0
SINUS PAIN: 1
RHINORRHEA: 0
CONSTIPATION: 0
FACIAL SWELLING: 0
GASTROINTESTINAL NEGATIVE: 1
VOICE CHANGE: 0
RESPIRATORY NEGATIVE: 1
ANAL BLEEDING: 0
SORE THROAT: 0
SHORTNESS OF BREATH: 0
DIARRHEA: 0
ABDOMINAL PAIN: 0
BACK PAIN: 1
VOMITING: 0
ABDOMINAL DISTENTION: 0
SINUS PRESSURE: 0

## 2018-07-27 ENCOUNTER — TELEPHONE (OUTPATIENT)
Dept: GASTROENTEROLOGY | Age: 38
End: 2018-07-27

## 2018-07-27 NOTE — TELEPHONE ENCOUNTER
Contacted 's office, spoke to Nydia, requested patient's appointment notes from February 2018 per 's request. Nydia will fax over office notes once doctor returns to office.

## 2018-08-22 ENCOUNTER — HOSPITAL ENCOUNTER (OUTPATIENT)
Age: 38
Setting detail: SPECIMEN
Discharge: HOME | End: 2018-08-22
Payer: COMMERCIAL

## 2018-08-22 DIAGNOSIS — Z71.2 ENCOUNTER TO DISCUSS TEST RESULTS: ICD-10-CM

## 2018-08-22 LAB
ABSOLUTE EOS #: 1.15 K/UL (ref 0–0.44)
ABSOLUTE IMMATURE GRANULOCYTE: 0.03 K/UL (ref 0–0.3)
ABSOLUTE LYMPH #: 3.12 K/UL (ref 1.1–3.7)
ABSOLUTE MONO #: 0.7 K/UL (ref 0.1–1.2)
ALBUMIN SERPL-MCNC: 4.6 G/DL (ref 3.5–5.2)
ALBUMIN/GLOBULIN RATIO: 1.6 (ref 1–2.5)
ALP BLD-CCNC: 114 U/L (ref 40–129)
ALT SERPL-CCNC: 28 U/L (ref 5–41)
AST SERPL-CCNC: 20 U/L
BASOPHILS # BLD: 1 % (ref 0–2)
BASOPHILS ABSOLUTE: 0.06 K/UL (ref 0–0.2)
BILIRUB SERPL-MCNC: 0.42 MG/DL (ref 0.3–1.2)
BILIRUBIN DIRECT: 0.1 MG/DL
BILIRUBIN, INDIRECT: 0.32 MG/DL (ref 0–1)
DIFFERENTIAL TYPE: ABNORMAL
EOSINOPHILS RELATIVE PERCENT: 12 % (ref 1–4)
GLOBULIN: NORMAL G/DL (ref 1.5–3.8)
HCT VFR BLD CALC: 46.1 % (ref 40.7–50.3)
HEMOGLOBIN: 15.2 G/DL (ref 13–17)
IMMATURE GRANULOCYTES: 0 %
LYMPHOCYTES # BLD: 32 % (ref 24–43)
MCH RBC QN AUTO: 30.7 PG (ref 25.2–33.5)
MCHC RBC AUTO-ENTMCNC: 33 G/DL (ref 28.4–34.8)
MCV RBC AUTO: 93.1 FL (ref 82.6–102.9)
MONOCYTES # BLD: 7 % (ref 3–12)
NRBC AUTOMATED: 0 PER 100 WBC
PDW BLD-RTO: 12.9 % (ref 11.8–14.4)
PLATELET # BLD: 272 K/UL (ref 138–453)
PLATELET ESTIMATE: ABNORMAL
PMV BLD AUTO: 9.7 FL (ref 8.1–13.5)
RBC # BLD: 4.95 M/UL (ref 4.21–5.77)
RBC # BLD: ABNORMAL 10*6/UL
SEG NEUTROPHILS: 48 % (ref 36–65)
SEGMENTED NEUTROPHILS ABSOLUTE COUNT: 4.78 K/UL (ref 1.5–8.1)
TOTAL PROTEIN: 7.4 G/DL (ref 6.4–8.3)
WBC # BLD: 9.8 K/UL (ref 3.5–11.3)
WBC # BLD: ABNORMAL 10*3/UL

## 2018-10-17 RX ORDER — OMEPRAZOLE 40 MG/1
CAPSULE, DELAYED RELEASE ORAL
Qty: 30 CAPSULE | Refills: 3 | Status: SHIPPED | OUTPATIENT
Start: 2018-10-17 | End: 2019-05-14 | Stop reason: SDUPTHER

## 2018-11-06 ENCOUNTER — OFFICE VISIT (OUTPATIENT)
Dept: GASTROENTEROLOGY | Age: 38
End: 2018-11-06
Payer: COMMERCIAL

## 2018-11-06 VITALS
SYSTOLIC BLOOD PRESSURE: 141 MMHG | WEIGHT: 253.1 LBS | BODY MASS INDEX: 31.64 KG/M2 | HEART RATE: 87 BPM | DIASTOLIC BLOOD PRESSURE: 103 MMHG

## 2018-11-06 DIAGNOSIS — R13.14 PHARYNGOESOPHAGEAL DYSPHAGIA: Primary | ICD-10-CM

## 2018-11-06 PROCEDURE — 99214 OFFICE O/P EST MOD 30 MIN: CPT | Performed by: INTERNAL MEDICINE

## 2018-11-06 RX ORDER — BUDESONIDE 3 MG/1
6 CAPSULE, COATED PELLETS ORAL EVERY MORNING
Qty: 60 CAPSULE | Refills: 2 | Status: SHIPPED | OUTPATIENT
Start: 2018-11-06 | End: 2020-01-23 | Stop reason: SDUPTHER

## 2018-11-06 ASSESSMENT — ENCOUNTER SYMPTOMS
NAUSEA: 0
DIARRHEA: 0
TROUBLE SWALLOWING: 1
BACK PAIN: 1
ANAL BLEEDING: 0
ABDOMINAL DISTENTION: 0
RECTAL PAIN: 0
SINUS PAIN: 0
CONSTIPATION: 0
COUGH: 0
SHORTNESS OF BREATH: 1
SORE THROAT: 0
VOMITING: 0
BLOOD IN STOOL: 0
ABDOMINAL PAIN: 0
SINUS PRESSURE: 0
CHEST TIGHTNESS: 0

## 2018-11-06 NOTE — PROGRESS NOTES
Subjective:      Patient ID: Angle Arevalo is a 45 y.o. male. Dr. Raphael Baez MD our mutual patient Angle Arevalo was seen  for   1. Pharyngoesophageal dysphagia        Patient seen for follow-up of dysphagia and GERD. He had EGD done in July 2018 and was found to have markedly increased eosinophils. In fact he had about 100 eosinophils in one high power field. Patient was advised fluticasone therapy. Unfortunately patient took this therapy for 4-6 weeks and stopped. He stated that he was getting headaches with that therapy. At present he is on PPI therapy and tolerating diet well. He does have GERD symptoms. No dysphagia. No weight loss. He had several food allergies and he stated that he is avoiding all those products that he is allergic to. Past Medical, Family, and Social History reviewed and does contribute to the patient presenting condition. patient\"s PMH/PSH,SH,PSYCH hx, MEDs, ALLERGIES, and ROS was all reviewed and updated ion the appropriate sections        HPI    Review of Systems   Constitutional: Negative for appetite change, fatigue and fever. HENT: Positive for congestion and trouble swallowing. Negative for sinus pain, sinus pressure and sore throat. Eyes: Negative for visual disturbance. Respiratory: Positive for shortness of breath. Negative for cough and chest tightness. Cardiovascular: Negative for chest pain, palpitations and leg swelling. Gastrointestinal: Negative for abdominal distention, abdominal pain, anal bleeding, blood in stool, constipation, diarrhea, nausea, rectal pain and vomiting. Endocrine: Negative. Genitourinary: Negative for difficulty urinating. Musculoskeletal: Positive for back pain. Negative for arthralgias and gait problem. Skin: Negative. Allergic/Immunologic: Negative for environmental allergies and food allergies. Neurological: Positive for dizziness. Negative for weakness, light-headedness and headaches.

## 2018-11-14 ENCOUNTER — HOSPITAL ENCOUNTER (OUTPATIENT)
Age: 38
Setting detail: SPECIMEN
Discharge: HOME | End: 2018-11-14
Payer: COMMERCIAL

## 2018-11-14 DIAGNOSIS — D72.829 LEUKOCYTOSIS, UNSPECIFIED TYPE: ICD-10-CM

## 2018-11-14 LAB
ABSOLUTE EOS #: 0.58 K/UL (ref 0–0.44)
ABSOLUTE IMMATURE GRANULOCYTE: 0.07 K/UL (ref 0–0.3)
ABSOLUTE LYMPH #: 2.55 K/UL (ref 1.1–3.7)
ABSOLUTE MONO #: 0.69 K/UL (ref 0.1–1.2)
BASOPHILS # BLD: 1 % (ref 0–2)
BASOPHILS ABSOLUTE: 0.07 K/UL (ref 0–0.2)
DIFFERENTIAL TYPE: ABNORMAL
EOSINOPHILS RELATIVE PERCENT: 6 % (ref 1–4)
HCT VFR BLD CALC: 47.2 % (ref 40.7–50.3)
HEMOGLOBIN: 15.5 G/DL (ref 13–17)
IMMATURE GRANULOCYTES: 1 %
LYMPHOCYTES # BLD: 28 % (ref 24–43)
MCH RBC QN AUTO: 30.8 PG (ref 25.2–33.5)
MCHC RBC AUTO-ENTMCNC: 32.8 G/DL (ref 28.4–34.8)
MCV RBC AUTO: 93.8 FL (ref 82.6–102.9)
MONOCYTES # BLD: 8 % (ref 3–12)
NRBC AUTOMATED: 0 PER 100 WBC
PDW BLD-RTO: 13.2 % (ref 11.8–14.4)
PLATELET # BLD: 276 K/UL (ref 138–453)
PLATELET ESTIMATE: ABNORMAL
PMV BLD AUTO: 9.9 FL (ref 8.1–13.5)
RBC # BLD: 5.03 M/UL (ref 4.21–5.77)
RBC # BLD: ABNORMAL 10*6/UL
SEG NEUTROPHILS: 56 % (ref 36–65)
SEGMENTED NEUTROPHILS ABSOLUTE COUNT: 5.21 K/UL (ref 1.5–8.1)
WBC # BLD: 9.2 K/UL (ref 3.5–11.3)
WBC # BLD: ABNORMAL 10*3/UL

## 2019-04-03 ENCOUNTER — OFFICE VISIT (OUTPATIENT)
Dept: GASTROENTEROLOGY | Age: 39
End: 2019-04-03
Payer: COMMERCIAL

## 2019-04-03 VITALS
SYSTOLIC BLOOD PRESSURE: 135 MMHG | WEIGHT: 253 LBS | HEIGHT: 75 IN | HEART RATE: 85 BPM | BODY MASS INDEX: 31.46 KG/M2 | DIASTOLIC BLOOD PRESSURE: 92 MMHG

## 2019-04-03 DIAGNOSIS — K20.0 EOSINOPHILIC ESOPHAGITIS: Primary | ICD-10-CM

## 2019-04-03 PROCEDURE — 99214 OFFICE O/P EST MOD 30 MIN: CPT | Performed by: INTERNAL MEDICINE

## 2019-04-03 RX ORDER — LOSARTAN POTASSIUM 25 MG/1
25 TABLET ORAL DAILY
COMMUNITY

## 2019-04-03 ASSESSMENT — ENCOUNTER SYMPTOMS
BACK PAIN: 1
ABDOMINAL DISTENTION: 0
VOMITING: 0
SORE THROAT: 0
TROUBLE SWALLOWING: 1
SINUS PAIN: 1
BLOOD IN STOOL: 0
SINUS PRESSURE: 1
CHEST TIGHTNESS: 0
ANAL BLEEDING: 0
EYE REDNESS: 0
DIARRHEA: 0
SHORTNESS OF BREATH: 1
APNEA: 0
RECTAL PAIN: 0
COUGH: 0
CHOKING: 0
ABDOMINAL PAIN: 0
NAUSEA: 0
WHEEZING: 0
VOICE CHANGE: 0
EYE PAIN: 0
CONSTIPATION: 0

## 2019-04-03 NOTE — PROGRESS NOTES
Subjective:      Patient ID: Brigette Trimble is a 44 y.o. male. HPI    Dr. Alessandra Young MD our mutual patient Brigette Trimble was seen  for No diagnosis found. .  Patient seen for follow-up of GERD and dysphagia. Patient is known to have eosinophilic esophagitis. Last time he was seen by me was in November 2018. At that time he was advised to take budesonide. Unfortunately patient did not follow this. He presented to the office stating that he has some swelling of the neck. Able to swallow without significant issues. He does have GERD symptoms, and PPI therapy helping him. No nausea vomiting. No weight loss. No dyspeptic symptoms. Past Medical, Family, and Social History reviewed and does contribute to the patient presenting condition. patient\"s PMH/PSH,SH,PSYCH hx, MEDs, ALLERGIES, and ROS was all reviewed and updated ion the appropriate sections        Review of Systems   Constitutional: Positive for fatigue. Negative for appetite change, fever and unexpected weight change. HENT: Positive for sinus pressure, sinus pain and trouble swallowing. Negative for mouth sores, sore throat and voice change. Eyes: Positive for visual disturbance (reading glasses). Negative for pain and redness. No ictirus   Respiratory: Positive for shortness of breath. Negative for apnea, cough, choking, chest tightness and wheezing. Cardiovascular: Positive for palpitations. Negative for chest pain and leg swelling. Gastrointestinal: Negative for abdominal distention, abdominal pain, anal bleeding, blood in stool, constipation, diarrhea, nausea, rectal pain and vomiting. Endocrine: Negative for cold intolerance, heat intolerance, polydipsia, polyphagia and polyuria. Genitourinary: Negative for difficulty urinating, frequency, hematuria and urgency. Musculoskeletal: Positive for back pain and neck pain. Negative for arthralgias, gait problem and joint swelling. Skin: Negative.   Negative for pallor and rash.   Allergic/Immunologic: Positive for environmental allergies (seasonal/dust) and food allergies (chicken/eggs/nuts). Neurological: Negative for dizziness, seizures, weakness and headaches. Hematological: Negative for adenopathy. Does not bruise/bleed easily. Psychiatric/Behavioral: Positive for sleep disturbance. Negative for agitation. The patient is not nervous/anxious. Objective:   Physical Exam   Constitutional: He is oriented to person, place, and time. He appears well-developed and well-nourished. No distress. HENT:   Head: Normocephalic and atraumatic. Mouth/Throat: No oropharyngeal exudate. Eyes: Pupils are equal, round, and reactive to light. Conjunctivae are normal. No scleral icterus. Neck: Normal range of motion. Neck supple. No tracheal deviation present. No thyromegaly present. Cardiovascular: Normal rate, regular rhythm, normal heart sounds and intact distal pulses. No murmur heard. Pulmonary/Chest: Effort normal and breath sounds normal. No respiratory distress. He has no wheezes. He has no rales. Abdominal: Soft. Bowel sounds are normal. He exhibits no distension, no ascites and no mass. There is no hepatomegaly. There is no tenderness. There is no guarding. No hernia. No peripheral signs of ch. Liver disease   Genitourinary: Rectum normal.   Musculoskeletal: He exhibits no edema. Lymphadenopathy:     He has no cervical adenopathy. Neurological: He is alert and oriented to person, place, and time. No cranial nerve deficit. Skin: Skin is warm and dry. No rash noted. No erythema. Psychiatric: Thought content normal.   Nursing note and vitals reviewed. Assessment:       Diagnosis Orders   1. Eosinophilic esophagitis  EGD           Plan:      Discussed with the patient regarding eosinophilic esophagitis and management. In the past he did not get benefit with fluticasone therapy.     Again discussed with him regarding budesonide therapy and advised to take 9 mg a day for one month and then decrease to 6 mg a day for 2 months and then 3 mg a day for couple of months. He is advised to have EGD in the next 3 months. Basing on the results/histology will plan further management.

## 2019-05-15 RX ORDER — OMEPRAZOLE 40 MG/1
CAPSULE, DELAYED RELEASE ORAL
Qty: 30 CAPSULE | Refills: 3 | Status: SHIPPED | OUTPATIENT
Start: 2019-05-15 | End: 2019-10-28 | Stop reason: SDUPTHER

## 2019-07-12 ENCOUNTER — TELEPHONE (OUTPATIENT)
Dept: GASTROENTEROLOGY | Age: 39
End: 2019-07-12

## 2019-07-19 ENCOUNTER — HOSPITAL ENCOUNTER (OUTPATIENT)
Age: 39
Setting detail: OUTPATIENT SURGERY
Discharge: HOME | End: 2019-07-19
Attending: INTERNAL MEDICINE | Admitting: INTERNAL MEDICINE
Payer: COMMERCIAL

## 2019-07-19 ENCOUNTER — ANESTHESIA (OUTPATIENT)
Dept: ENDOSCOPY | Age: 39
End: 2019-07-19
Payer: COMMERCIAL

## 2019-07-19 ENCOUNTER — ANESTHESIA EVENT (OUTPATIENT)
Dept: ENDOSCOPY | Age: 39
End: 2019-07-19
Payer: COMMERCIAL

## 2019-07-19 VITALS
SYSTOLIC BLOOD PRESSURE: 157 MMHG | BODY MASS INDEX: 30.46 KG/M2 | WEIGHT: 245 LBS | OXYGEN SATURATION: 95 % | HEIGHT: 75 IN | DIASTOLIC BLOOD PRESSURE: 111 MMHG | HEART RATE: 100 BPM | RESPIRATION RATE: 16 BRPM | TEMPERATURE: 98.1 F

## 2019-07-19 VITALS — SYSTOLIC BLOOD PRESSURE: 180 MMHG | DIASTOLIC BLOOD PRESSURE: 124 MMHG | OXYGEN SATURATION: 88 %

## 2019-07-19 PROCEDURE — 2500000003 HC RX 250 WO HCPCS: Performed by: NURSE ANESTHETIST, CERTIFIED REGISTERED

## 2019-07-19 PROCEDURE — 43239 EGD BIOPSY SINGLE/MULTIPLE: CPT | Performed by: INTERNAL MEDICINE

## 2019-07-19 PROCEDURE — 2580000003 HC RX 258: Performed by: ANESTHESIOLOGY

## 2019-07-19 PROCEDURE — 7100000000 HC PACU RECOVERY - FIRST 15 MIN: Performed by: INTERNAL MEDICINE

## 2019-07-19 PROCEDURE — 7100000001 HC PACU RECOVERY - ADDTL 15 MIN: Performed by: INTERNAL MEDICINE

## 2019-07-19 PROCEDURE — 2709999900 HC NON-CHARGEABLE SUPPLY: Performed by: INTERNAL MEDICINE

## 2019-07-19 PROCEDURE — 3609012400 HC EGD TRANSORAL BIOPSY SINGLE/MULTIPLE: Performed by: INTERNAL MEDICINE

## 2019-07-19 PROCEDURE — 3700000001 HC ADD 15 MINUTES (ANESTHESIA): Performed by: INTERNAL MEDICINE

## 2019-07-19 PROCEDURE — 88305 TISSUE EXAM BY PATHOLOGIST: CPT

## 2019-07-19 PROCEDURE — 6360000002 HC RX W HCPCS: Performed by: NURSE ANESTHETIST, CERTIFIED REGISTERED

## 2019-07-19 PROCEDURE — 3700000000 HC ANESTHESIA ATTENDED CARE: Performed by: INTERNAL MEDICINE

## 2019-07-19 RX ORDER — SODIUM CHLORIDE, SODIUM LACTATE, POTASSIUM CHLORIDE, CALCIUM CHLORIDE 600; 310; 30; 20 MG/100ML; MG/100ML; MG/100ML; MG/100ML
INJECTION, SOLUTION INTRAVENOUS CONTINUOUS
Status: DISCONTINUED | OUTPATIENT
Start: 2019-07-19 | End: 2019-07-19 | Stop reason: HOSPADM

## 2019-07-19 RX ORDER — LIDOCAINE HYDROCHLORIDE 10 MG/ML
INJECTION, SOLUTION EPIDURAL; INFILTRATION; INTRACAUDAL; PERINEURAL PRN
Status: DISCONTINUED | OUTPATIENT
Start: 2019-07-19 | End: 2019-07-19 | Stop reason: SDUPTHER

## 2019-07-19 RX ORDER — PROPOFOL 10 MG/ML
INJECTION, EMULSION INTRAVENOUS PRN
Status: DISCONTINUED | OUTPATIENT
Start: 2019-07-19 | End: 2019-07-19 | Stop reason: SDUPTHER

## 2019-07-19 RX ADMIN — LIDOCAINE HYDROCHLORIDE 50 MG: 10 INJECTION, SOLUTION EPIDURAL; INFILTRATION; INTRACAUDAL; PERINEURAL at 09:54

## 2019-07-19 RX ADMIN — PROPOFOL 200 MG: 10 INJECTION, EMULSION INTRAVENOUS at 09:54

## 2019-07-19 RX ADMIN — SODIUM CHLORIDE, POTASSIUM CHLORIDE, SODIUM LACTATE AND CALCIUM CHLORIDE: 600; 310; 30; 20 INJECTION, SOLUTION INTRAVENOUS at 09:24

## 2019-07-19 RX ADMIN — PROPOFOL 50 MG: 10 INJECTION, EMULSION INTRAVENOUS at 09:57

## 2019-07-19 ASSESSMENT — PULMONARY FUNCTION TESTS
PIF_VALUE: 1

## 2019-07-19 ASSESSMENT — ENCOUNTER SYMPTOMS: STRIDOR: 0

## 2019-07-19 ASSESSMENT — PAIN - FUNCTIONAL ASSESSMENT: PAIN_FUNCTIONAL_ASSESSMENT: 0-10

## 2019-07-19 NOTE — ANESTHESIA PRE PROCEDURE
dysphagia. Endo/Other: Negative Endo/Other ROS                    Abdominal:           Vascular: negative vascular ROS. Anesthesia Plan      MAC     ASA 2       Induction: intravenous. Anesthetic plan and risks discussed with patient. Plan discussed with CRNA.                   Kishor Boyle MD   7/19/2019

## 2019-07-19 NOTE — H&P
HISTORY and Trereynaldo Ghosh [facility]47       NAME:  Morgan Duque  MRN: [id number]   YOB: 1980   Date: 7/19/2019   Age: 44 y.o. Gender: male       COMPLAINT AND PRESENT HISTORY:     Morgan Duque is 44 y.o.,   male, having an EGD. Pt has Eosinophilic Esophagitis. Patient denies any current GI symptoms. No nausea / vomiting, No diarrhea or constipation. No abdominal pains or cramping, No heartburn, no changes in the color of the stools. No fever or chills. Last EGD was 2 years ago. PAST MEDICAL HISTORY     Past Medical History:   Diagnosis Date    History of kidney stones     Hypertension        SURGICAL HISTORY       Past Surgical History:   Procedure Laterality Date    COLONOSCOPY  12/08/2017    No lesions seen up to the cecum and a couple of inches of ileum. Random biopsies taken. , pathology--LARGE INTESTINAL TYPE MUCOSA DEMONSTRATING NO SIGNIFICANT     HERNIA REPAIR Left 07/27/2016    Dr Jyoti Roman W/BIOPSY SINGLE/MULTIPLE N/A 12/8/2017    COLONOSCOPY WITH BIOPSY performed by Sebas Fishman MD at [address] [place] [address] EGD TRANSORAL BIOPSY SINGLE/MULTIPLE N/A 12/8/2017    EGD BIOPSY performed by Sebas [facility]man MD at [facility]  05/26/2009    pathology-focal active esophagitis, showing prominent eosinophilia-up to 50 esonophils per high power field    UPPER GASTROINTESTINAL ENDOSCOPY  12/08/2017    Probable eosinophilic esophagitis no stricture seen, pathology-SQUAMOUS EPITHELIUM WITH EOSINOPHILIA (SEE COMMENT)     UPPER GASTROINTESTINAL ENDOSCOPY N/A 7/6/2018    EGD BIOPSY performed by Sebas Fishman MD at [address] [place] [address]       Family History   Problem Relation Age of Onset    Cancer Mother     High Cholesterol Father     High Cholesterol Brother        SOCIAL HISTORY       Social History     Socioeconomic History    Marital status:      Spouse name: None   

## 2019-07-19 NOTE — ANESTHESIA POSTPROCEDURE EVALUATION
Department of Anesthesiology  Postprocedure Note    Patient: Mary Root  MRN: [id number]  YOB: 1980  Date of evaluation: 7/19/2019  Time:  11:20 AM     Procedure Summary     Date:  07/19/19 Room / Location:  [facility] ENDO 04 / [facility] ENDO    Anesthesia Start:  2218 Anesthesia Stop:  6717    Procedure:  EGD BIOPSY (N/A Esophagus) Diagnosis:  (EOSINOPHILIC ESOPHAGITIS     PAT ON ADMIT PER ANES)    Surgeon:  Taylor Jordan MD Responsible Provider:  Brenden Moreno MD    Anesthesia Type:  MAC ASA Status:  2          Anesthesia Type: MAC    Kaia Phase I: Kaia Score: 10    Kaia Phase II:      Last vitals: Reviewed and per EMR flowsheets.        Anesthesia Post Evaluation    Comments: POST- ANESTHESIA EVALUATION       Pt Name: Mary Root  MRN: [id number]  YOB: 1980  Date of evaluation: 7/19/2019  Time:  11:20 AM      BP (!) 157/111   Pulse 100   Temp 98.1 °F (36.7 °C)   Resp 16   Ht 6' 3\" (1.905 m)   Wt 245 lb (111.1 kg)   SpO2 95%   BMI 30.62 kg/m²      Consciousness Level  Awake  Cardiopulmonary Status  Stable  Pain Adequately Treated YES  Nausea / Vomiting  NO  Adequate Hydration  YES  Anesthesia Related Complications NONE      Electronically signed by Brenden Moreno MD on 7/19/2019 at 11:20 AM

## 2019-07-22 LAB — SURGICAL PATHOLOGY REPORT: NORMAL

## 2019-08-02 ENCOUNTER — TELEPHONE (OUTPATIENT)
Dept: GASTROENTEROLOGY | Age: 39
End: 2019-08-02

## 2019-08-07 ENCOUNTER — PATIENT MESSAGE (OUTPATIENT)
Dept: GASTROENTEROLOGY | Age: 39
End: 2019-08-07

## 2019-08-28 ENCOUNTER — OFFICE VISIT (OUTPATIENT)
Dept: GASTROENTEROLOGY | Age: 39
End: 2019-08-28
Payer: COMMERCIAL

## 2019-08-28 VITALS
WEIGHT: 251.1 LBS | DIASTOLIC BLOOD PRESSURE: 101 MMHG | SYSTOLIC BLOOD PRESSURE: 148 MMHG | BODY MASS INDEX: 31.39 KG/M2 | HEART RATE: 82 BPM

## 2019-08-28 DIAGNOSIS — K20.0 EOSINOPHILIC ESOPHAGITIS: Primary | ICD-10-CM

## 2019-08-28 DIAGNOSIS — R13.14 PHARYNGOESOPHAGEAL DYSPHAGIA: ICD-10-CM

## 2019-08-28 PROCEDURE — 99214 OFFICE O/P EST MOD 30 MIN: CPT | Performed by: INTERNAL MEDICINE

## 2019-08-28 ASSESSMENT — ENCOUNTER SYMPTOMS
VOMITING: 0
WHEEZING: 0
COUGH: 0
NAUSEA: 0
ABDOMINAL PAIN: 1
BLOOD IN STOOL: 0
CONSTIPATION: 0
EYES NEGATIVE: 1
ALLERGIC/IMMUNOLOGIC NEGATIVE: 1
VOICE CHANGE: 0
RECTAL PAIN: 0
BACK PAIN: 0
SORE THROAT: 1
CHOKING: 0
SINUS PRESSURE: 0
RESPIRATORY NEGATIVE: 1
ABDOMINAL DISTENTION: 0
DIARRHEA: 0
TROUBLE SWALLOWING: 0
ANAL BLEEDING: 0

## 2019-09-03 ENCOUNTER — TELEPHONE (OUTPATIENT)
Dept: GASTROENTEROLOGY | Age: 39
End: 2019-09-03

## 2019-09-03 DIAGNOSIS — K20.0 EOSINOPHILIC ESOPHAGITIS: Primary | ICD-10-CM

## 2019-09-07 ENCOUNTER — APPOINTMENT (OUTPATIENT)
Dept: GENERAL RADIOLOGY | Age: 39
End: 2019-09-07
Payer: COMMERCIAL

## 2019-09-07 ENCOUNTER — HOSPITAL ENCOUNTER (EMERGENCY)
Age: 39
Discharge: HOME | End: 2019-09-07
Attending: EMERGENCY MEDICINE
Payer: COMMERCIAL

## 2019-09-07 VITALS
HEART RATE: 77 BPM | OXYGEN SATURATION: 99 % | RESPIRATION RATE: 18 BRPM | TEMPERATURE: 98.2 F | HEIGHT: 75 IN | WEIGHT: 250 LBS | SYSTOLIC BLOOD PRESSURE: 145 MMHG | DIASTOLIC BLOOD PRESSURE: 90 MMHG | BODY MASS INDEX: 31.08 KG/M2

## 2019-09-07 DIAGNOSIS — R07.9 CHEST PAIN, UNSPECIFIED TYPE: Primary | ICD-10-CM

## 2019-09-07 LAB
-: NORMAL
ABSOLUTE EOS #: 0.7 K/UL (ref 0–0.4)
ABSOLUTE IMMATURE GRANULOCYTE: ABNORMAL K/UL (ref 0–0.3)
ABSOLUTE LYMPH #: 2.8 K/UL (ref 1–4.8)
ABSOLUTE MONO #: 0.7 K/UL (ref 0.1–1.2)
ANION GAP SERPL CALCULATED.3IONS-SCNC: 11 MMOL/L (ref 9–17)
BASOPHILS # BLD: 1 % (ref 0–2)
BASOPHILS ABSOLUTE: 0.1 K/UL (ref 0–0.2)
BUN BLDV-MCNC: 13 MG/DL (ref 6–20)
BUN/CREAT BLD: ABNORMAL (ref 9–20)
CALCIUM SERPL-MCNC: 9.3 MG/DL (ref 8.6–10.4)
CHLORIDE BLD-SCNC: 104 MMOL/L (ref 98–107)
CO2: 26 MMOL/L (ref 20–31)
CREAT SERPL-MCNC: 0.96 MG/DL (ref 0.7–1.2)
DIFFERENTIAL TYPE: ABNORMAL
EOSINOPHILS RELATIVE PERCENT: 7 % (ref 1–4)
GFR AFRICAN AMERICAN: >60 ML/MIN
GFR NON-AFRICAN AMERICAN: >60 ML/MIN
GFR SERPL CREATININE-BSD FRML MDRD: ABNORMAL ML/MIN/{1.73_M2}
GFR SERPL CREATININE-BSD FRML MDRD: ABNORMAL ML/MIN/{1.73_M2}
GLUCOSE BLD-MCNC: 103 MG/DL (ref 70–99)
HCT VFR BLD CALC: 48.8 % (ref 41–53)
HEMOGLOBIN: 16.7 G/DL (ref 13.5–17.5)
IMMATURE GRANULOCYTES: ABNORMAL %
LYMPHOCYTES # BLD: 25 % (ref 24–44)
MCH RBC QN AUTO: 32.1 PG (ref 26–34)
MCHC RBC AUTO-ENTMCNC: 34.3 G/DL (ref 31–37)
MCV RBC AUTO: 93.6 FL (ref 80–100)
MONOCYTES # BLD: 6 % (ref 2–11)
NRBC AUTOMATED: ABNORMAL PER 100 WBC
PDW BLD-RTO: 13.9 % (ref 12.5–15.4)
PLATELET # BLD: 285 K/UL (ref 140–450)
PLATELET ESTIMATE: ABNORMAL
PMV BLD AUTO: 8.1 FL (ref 6–12)
POTASSIUM SERPL-SCNC: 4.2 MMOL/L (ref 3.7–5.3)
RBC # BLD: 5.21 M/UL (ref 4.5–5.9)
RBC # BLD: ABNORMAL 10*6/UL
REASON FOR REJECTION: NORMAL
SEG NEUTROPHILS: 61 % (ref 36–66)
SEGMENTED NEUTROPHILS ABSOLUTE COUNT: 6.7 K/UL (ref 1.8–7.7)
SODIUM BLD-SCNC: 141 MMOL/L (ref 135–144)
TROPONIN INTERP: NORMAL
TROPONIN T: NORMAL NG/ML
TROPONIN, HIGH SENSITIVITY: <6 NG/L (ref 0–22)
WBC # BLD: 11 K/UL (ref 3.5–11)
WBC # BLD: ABNORMAL 10*3/UL
ZZ NTE CLEAN UP: ORDERED TEST: NORMAL
ZZ NTE WITH NAME CLEAN UP: SPECIMEN SOURCE: NORMAL

## 2019-09-07 PROCEDURE — 84484 ASSAY OF TROPONIN QUANT: CPT

## 2019-09-07 PROCEDURE — 93005 ELECTROCARDIOGRAM TRACING: CPT

## 2019-09-07 PROCEDURE — 85025 COMPLETE CBC W/AUTO DIFF WBC: CPT

## 2019-09-07 PROCEDURE — 71045 X-RAY EXAM CHEST 1 VIEW: CPT

## 2019-09-07 PROCEDURE — 80048 BASIC METABOLIC PNL TOTAL CA: CPT

## 2019-09-07 PROCEDURE — 36415 COLL VENOUS BLD VENIPUNCTURE: CPT

## 2019-09-07 PROCEDURE — 99285 EMERGENCY DEPT VISIT HI MDM: CPT

## 2019-09-07 ASSESSMENT — PAIN DESCRIPTION - DESCRIPTORS: DESCRIPTORS: PRESSURE;TIGHTNESS

## 2019-09-07 ASSESSMENT — PAIN SCALES - GENERAL: PAINLEVEL_OUTOF10: 6

## 2019-09-07 ASSESSMENT — PAIN DESCRIPTION - LOCATION: LOCATION: CHEST

## 2019-09-07 ASSESSMENT — PAIN DESCRIPTION - ORIENTATION: ORIENTATION: LEFT

## 2019-09-08 NOTE — ED PROVIDER NOTES
with the below findings:  XR CHEST PORTABLE   Final Result   Cardiomegaly, appearance of which is likely at least partially projectional.      Technically suboptimal exam because of poor inspiration. No definite focal infiltrate or acute pulmonary disease. Follow-up full inspiration PA and lateral chest may be useful for better   characterization of cardiopulmonary findings. LABS:  Results for orders placed or performed during the hospital encounter of 09/07/19   Troponin   Result Value Ref Range    Troponin, High Sensitivity <6 0 - 22 ng/L    Troponin T NOT REPORTED <0.03 ng/mL    Troponin Interp NOT REPORTED    CBC Auto Differential   Result Value Ref Range    WBC 11.0 3.5 - 11.0 k/uL    RBC 5.21 4.5 - 5.9 m/uL    Hemoglobin 16.7 13.5 - 17.5 g/dL    Hematocrit 48.8 41 - 53 %    MCV 93.6 80 - 100 fL    MCH 32.1 26 - 34 pg    MCHC 34.3 31 - 37 g/dL    RDW 13.9 12.5 - 15.4 %    Platelets 553 623 - 998 k/uL    MPV 8.1 6.0 - 12.0 fL    NRBC Automated NOT REPORTED per 100 WBC    Differential Type NOT REPORTED     Seg Neutrophils 61 36 - 66 %    Lymphocytes 25 24 - 44 %    Monocytes 6 2 - 11 %    Eosinophils % 7 (H) 1 - 4 %    Basophils 1 0 - 2 %    Immature Granulocytes NOT REPORTED 0 %    Segs Absolute 6.70 1.8 - 7.7 k/uL    Absolute Lymph # 2.80 1.0 - 4.8 k/uL    Absolute Mono # 0.70 0.1 - 1.2 k/uL    Absolute Eos # 0.70 (H) 0.0 - 0.4 k/uL    Basophils Absolute 0.10 0.0 - 0.2 k/uL    Absolute Immature Granulocyte NOT REPORTED 0.00 - 0.30 k/uL    WBC Morphology NOT REPORTED     RBC Morphology NOT REPORTED     Platelet Estimate NOT REPORTED    SPECIMEN REJECTION   Result Value Ref Range    Specimen Source . BLOOD     Ordered Test BMP     Reason for Rejection Unable to perform testing: Specimen hemolyzed.      - NOT REPORTED        ABNORMAL LABS:  Labs Reviewed   CBC WITH AUTO DIFFERENTIAL - Abnormal; Notable for the following components:       Result Value    Eosinophils % 7 (*)     Absolute Eos #

## 2019-09-15 LAB
EKG ATRIAL RATE: 82 BPM
EKG P AXIS: 37 DEGREES
EKG P-R INTERVAL: 148 MS
EKG Q-T INTERVAL: 332 MS
EKG QRS DURATION: 84 MS
EKG QTC CALCULATION (BAZETT): 387 MS
EKG R AXIS: -13 DEGREES
EKG T AXIS: -11 DEGREES
EKG VENTRICULAR RATE: 82 BPM

## 2019-10-29 RX ORDER — OMEPRAZOLE 40 MG/1
CAPSULE, DELAYED RELEASE ORAL
Qty: 30 CAPSULE | Refills: 3 | Status: SHIPPED | OUTPATIENT
Start: 2019-10-29

## 2020-01-15 ENCOUNTER — APPOINTMENT (OUTPATIENT)
Dept: CT IMAGING | Age: 40
DRG: 661 | End: 2020-01-15
Payer: COMMERCIAL

## 2020-01-15 ENCOUNTER — HOSPITAL ENCOUNTER (EMERGENCY)
Age: 40
Discharge: HOME | DRG: 661 | End: 2020-01-15
Attending: EMERGENCY MEDICINE
Payer: COMMERCIAL

## 2020-01-15 ENCOUNTER — HOSPITAL ENCOUNTER (INPATIENT)
Age: 40
LOS: 1 days | Discharge: HOME | DRG: 661 | End: 2020-01-16
Attending: SPECIALIST | Admitting: INTERNAL MEDICINE
Payer: COMMERCIAL

## 2020-01-15 VITALS
SYSTOLIC BLOOD PRESSURE: 138 MMHG | OXYGEN SATURATION: 96 % | RESPIRATION RATE: 16 BRPM | HEART RATE: 76 BPM | DIASTOLIC BLOOD PRESSURE: 95 MMHG | TEMPERATURE: 98 F

## 2020-01-15 PROBLEM — N13.30 HYDROURETERONEPHROSIS: Status: ACTIVE | Noted: 2020-01-15

## 2020-01-15 LAB
-: ABNORMAL
ALBUMIN SERPL-MCNC: 4 G/DL (ref 3.5–5.2)
ALBUMIN/GLOBULIN RATIO: 1.3 (ref 1–2.5)
ALP BLD-CCNC: 103 U/L (ref 40–129)
ALT SERPL-CCNC: 41 U/L (ref 5–41)
AMORPHOUS: ABNORMAL
ANION GAP SERPL CALCULATED.3IONS-SCNC: 15 MMOL/L (ref 9–17)
AST SERPL-CCNC: 26 U/L
BACTERIA: ABNORMAL
BILIRUB SERPL-MCNC: 0.3 MG/DL (ref 0.3–1.2)
BILIRUBIN DIRECT: 0.13 MG/DL
BILIRUBIN URINE: NEGATIVE
BILIRUBIN, INDIRECT: 0.17 MG/DL (ref 0–1)
BUN BLDV-MCNC: 11 MG/DL (ref 6–20)
BUN/CREAT BLD: ABNORMAL (ref 9–20)
CALCIUM SERPL-MCNC: 8.8 MG/DL (ref 8.6–10.4)
CASTS UA: ABNORMAL /LPF
CHLORIDE BLD-SCNC: 99 MMOL/L (ref 98–107)
CO2: 23 MMOL/L (ref 20–31)
COLOR: YELLOW
COMMENT UA: ABNORMAL
CREAT SERPL-MCNC: 1.01 MG/DL (ref 0.7–1.2)
CRYSTALS, UA: ABNORMAL /HPF
EPITHELIAL CELLS UA: ABNORMAL /HPF (ref 0–5)
GFR AFRICAN AMERICAN: >60 ML/MIN
GFR NON-AFRICAN AMERICAN: >60 ML/MIN
GFR SERPL CREATININE-BSD FRML MDRD: ABNORMAL ML/MIN/{1.73_M2}
GFR SERPL CREATININE-BSD FRML MDRD: ABNORMAL ML/MIN/{1.73_M2}
GLOBULIN: NORMAL G/DL (ref 1.5–3.8)
GLUCOSE BLD-MCNC: 189 MG/DL (ref 70–99)
GLUCOSE URINE: NEGATIVE
HCT VFR BLD CALC: 45 % (ref 41–53)
HEMOGLOBIN: 15.3 G/DL (ref 13.5–17.5)
KETONES, URINE: NEGATIVE
LEUKOCYTE ESTERASE, URINE: ABNORMAL
LIPASE: 15 U/L (ref 13–60)
MCH RBC QN AUTO: 32.1 PG (ref 26–34)
MCHC RBC AUTO-ENTMCNC: 34.1 G/DL (ref 31–37)
MCV RBC AUTO: 94.2 FL (ref 80–100)
MUCUS: ABNORMAL
NITRITE, URINE: NEGATIVE
NRBC AUTOMATED: NORMAL PER 100 WBC
OTHER OBSERVATIONS UA: ABNORMAL
PDW BLD-RTO: 13.9 % (ref 12.5–15.4)
PH UA: 5.5 (ref 5–8)
PLATELET # BLD: 272 K/UL (ref 140–450)
PMV BLD AUTO: 7.7 FL (ref 6–12)
POTASSIUM SERPL-SCNC: 3.8 MMOL/L (ref 3.7–5.3)
PROTEIN UA: NEGATIVE
RBC # BLD: 4.77 M/UL (ref 4.5–5.9)
RBC UA: ABNORMAL /HPF (ref 0–2)
RENAL EPITHELIAL, UA: ABNORMAL /HPF
SODIUM BLD-SCNC: 137 MMOL/L (ref 135–144)
SPECIFIC GRAVITY UA: 1.02 (ref 1–1.03)
TOTAL PROTEIN: 7.2 G/DL (ref 6.4–8.3)
TRICHOMONAS: ABNORMAL
TURBIDITY: CLEAR
URINE HGB: ABNORMAL
UROBILINOGEN, URINE: NORMAL
WBC # BLD: 10.2 K/UL (ref 3.5–11)
WBC UA: ABNORMAL /HPF (ref 0–5)
YEAST: ABNORMAL

## 2020-01-15 PROCEDURE — 83690 ASSAY OF LIPASE: CPT

## 2020-01-15 PROCEDURE — 96375 TX/PRO/DX INJ NEW DRUG ADDON: CPT

## 2020-01-15 PROCEDURE — 80076 HEPATIC FUNCTION PANEL: CPT

## 2020-01-15 PROCEDURE — 2580000003 HC RX 258: Performed by: NURSE PRACTITIONER

## 2020-01-15 PROCEDURE — 51798 US URINE CAPACITY MEASURE: CPT

## 2020-01-15 PROCEDURE — 6360000002 HC RX W HCPCS: Performed by: SPECIALIST

## 2020-01-15 PROCEDURE — 6360000002 HC RX W HCPCS: Performed by: NURSE PRACTITIONER

## 2020-01-15 PROCEDURE — 80048 BASIC METABOLIC PNL TOTAL CA: CPT

## 2020-01-15 PROCEDURE — 96374 THER/PROPH/DIAG INJ IV PUSH: CPT

## 2020-01-15 PROCEDURE — 2580000003 HC RX 258: Performed by: EMERGENCY MEDICINE

## 2020-01-15 PROCEDURE — 2580000003 HC RX 258: Performed by: UROLOGY

## 2020-01-15 PROCEDURE — 2060000000 HC ICU INTERMEDIATE R&B

## 2020-01-15 PROCEDURE — 81001 URINALYSIS AUTO W/SCOPE: CPT

## 2020-01-15 PROCEDURE — 6370000000 HC RX 637 (ALT 250 FOR IP): Performed by: NURSE PRACTITIONER

## 2020-01-15 PROCEDURE — 36415 COLL VENOUS BLD VENIPUNCTURE: CPT

## 2020-01-15 PROCEDURE — 87086 URINE CULTURE/COLONY COUNT: CPT

## 2020-01-15 PROCEDURE — 2580000003 HC RX 258: Performed by: SPECIALIST

## 2020-01-15 PROCEDURE — 99284 EMERGENCY DEPT VISIT MOD MDM: CPT

## 2020-01-15 PROCEDURE — 6360000002 HC RX W HCPCS: Performed by: EMERGENCY MEDICINE

## 2020-01-15 PROCEDURE — 6370000000 HC RX 637 (ALT 250 FOR IP): Performed by: EMERGENCY MEDICINE

## 2020-01-15 PROCEDURE — 85027 COMPLETE CBC AUTOMATED: CPT

## 2020-01-15 PROCEDURE — 74176 CT ABD & PELVIS W/O CONTRAST: CPT

## 2020-01-15 PROCEDURE — 6360000002 HC RX W HCPCS: Performed by: UROLOGY

## 2020-01-15 RX ORDER — MORPHINE SULFATE 4 MG/ML
4 INJECTION, SOLUTION INTRAMUSCULAR; INTRAVENOUS
Status: DISCONTINUED | OUTPATIENT
Start: 2020-01-15 | End: 2020-01-16 | Stop reason: HOSPADM

## 2020-01-15 RX ORDER — TAMSULOSIN HYDROCHLORIDE 0.4 MG/1
0.4 CAPSULE ORAL DAILY
Status: DISCONTINUED | OUTPATIENT
Start: 2020-01-16 | End: 2020-01-15 | Stop reason: SDUPTHER

## 2020-01-15 RX ORDER — 0.9 % SODIUM CHLORIDE 0.9 %
500 INTRAVENOUS SOLUTION INTRAVENOUS ONCE
Status: COMPLETED | OUTPATIENT
Start: 2020-01-15 | End: 2020-01-16

## 2020-01-15 RX ORDER — HYDROCODONE BITARTRATE AND ACETAMINOPHEN 5; 325 MG/1; MG/1
1 TABLET ORAL EVERY 8 HOURS PRN
Qty: 10 TABLET | Refills: 0 | Status: SHIPPED | OUTPATIENT
Start: 2020-01-15 | End: 2020-01-22

## 2020-01-15 RX ORDER — TAMSULOSIN HYDROCHLORIDE 0.4 MG/1
0.4 CAPSULE ORAL NIGHTLY
Status: DISCONTINUED | OUTPATIENT
Start: 2020-01-15 | End: 2020-01-16 | Stop reason: HOSPADM

## 2020-01-15 RX ORDER — ACETAMINOPHEN 325 MG/1
650 TABLET ORAL EVERY 4 HOURS PRN
Status: DISCONTINUED | OUTPATIENT
Start: 2020-01-15 | End: 2020-01-16 | Stop reason: HOSPADM

## 2020-01-15 RX ORDER — NICOTINE 21 MG/24HR
1 PATCH, TRANSDERMAL 24 HOURS TRANSDERMAL DAILY PRN
Status: DISCONTINUED | OUTPATIENT
Start: 2020-01-15 | End: 2020-01-16

## 2020-01-15 RX ORDER — ONDANSETRON 2 MG/ML
4 INJECTION INTRAMUSCULAR; INTRAVENOUS ONCE
Status: COMPLETED | OUTPATIENT
Start: 2020-01-15 | End: 2020-01-15

## 2020-01-15 RX ORDER — MORPHINE SULFATE 4 MG/ML
4 INJECTION, SOLUTION INTRAMUSCULAR; INTRAVENOUS ONCE
Status: COMPLETED | OUTPATIENT
Start: 2020-01-15 | End: 2020-01-15

## 2020-01-15 RX ORDER — MORPHINE SULFATE 2 MG/ML
2 INJECTION, SOLUTION INTRAMUSCULAR; INTRAVENOUS
Status: DISCONTINUED | OUTPATIENT
Start: 2020-01-15 | End: 2020-01-16 | Stop reason: HOSPADM

## 2020-01-15 RX ORDER — SODIUM CHLORIDE 0.9 % (FLUSH) 0.9 %
10 SYRINGE (ML) INJECTION EVERY 12 HOURS SCHEDULED
Status: DISCONTINUED | OUTPATIENT
Start: 2020-01-15 | End: 2020-01-16 | Stop reason: HOSPADM

## 2020-01-15 RX ORDER — ONDANSETRON 4 MG/1
4 TABLET, ORALLY DISINTEGRATING ORAL EVERY 8 HOURS PRN
Qty: 10 TABLET | Refills: 0 | Status: ON HOLD | OUTPATIENT
Start: 2020-01-15 | End: 2020-09-11 | Stop reason: ALTCHOICE

## 2020-01-15 RX ORDER — BUPROPION HYDROCHLORIDE 150 MG/1
300 TABLET ORAL EVERY MORNING
Status: DISCONTINUED | OUTPATIENT
Start: 2020-01-16 | End: 2020-01-16 | Stop reason: HOSPADM

## 2020-01-15 RX ORDER — SODIUM CHLORIDE 9 MG/ML
INJECTION, SOLUTION INTRAVENOUS CONTINUOUS
Status: DISCONTINUED | OUTPATIENT
Start: 2020-01-15 | End: 2020-01-16 | Stop reason: HOSPADM

## 2020-01-15 RX ORDER — CIPROFLOXACIN 2 MG/ML
400 INJECTION, SOLUTION INTRAVENOUS EVERY 12 HOURS
Status: DISCONTINUED | OUTPATIENT
Start: 2020-01-15 | End: 2020-01-16 | Stop reason: HOSPADM

## 2020-01-15 RX ORDER — ONDANSETRON 2 MG/ML
4 INJECTION INTRAMUSCULAR; INTRAVENOUS EVERY 6 HOURS PRN
Status: DISCONTINUED | OUTPATIENT
Start: 2020-01-15 | End: 2020-01-16 | Stop reason: HOSPADM

## 2020-01-15 RX ORDER — 0.9 % SODIUM CHLORIDE 0.9 %
1000 INTRAVENOUS SOLUTION INTRAVENOUS ONCE
Status: COMPLETED | OUTPATIENT
Start: 2020-01-15 | End: 2020-01-15

## 2020-01-15 RX ORDER — PROMETHAZINE HYDROCHLORIDE 25 MG/ML
12.5 INJECTION, SOLUTION INTRAMUSCULAR; INTRAVENOUS EVERY 4 HOURS PRN
Status: DISCONTINUED | OUTPATIENT
Start: 2020-01-15 | End: 2020-01-16 | Stop reason: HOSPADM

## 2020-01-15 RX ORDER — CETIRIZINE HYDROCHLORIDE 10 MG/1
10 TABLET ORAL DAILY
Status: DISCONTINUED | OUTPATIENT
Start: 2020-01-16 | End: 2020-01-16 | Stop reason: HOSPADM

## 2020-01-15 RX ORDER — 0.9 % SODIUM CHLORIDE 0.9 %
500 INTRAVENOUS SOLUTION INTRAVENOUS ONCE
Status: COMPLETED | OUTPATIENT
Start: 2020-01-15 | End: 2020-01-15

## 2020-01-15 RX ORDER — KETOROLAC TROMETHAMINE 30 MG/ML
30 INJECTION, SOLUTION INTRAMUSCULAR; INTRAVENOUS ONCE
Status: COMPLETED | OUTPATIENT
Start: 2020-01-15 | End: 2020-01-15

## 2020-01-15 RX ORDER — KETOROLAC TROMETHAMINE 30 MG/ML
30 INJECTION, SOLUTION INTRAMUSCULAR; INTRAVENOUS EVERY 6 HOURS
Status: DISCONTINUED | OUTPATIENT
Start: 2020-01-15 | End: 2020-01-16 | Stop reason: HOSPADM

## 2020-01-15 RX ORDER — HYDROCODONE BITARTRATE AND ACETAMINOPHEN 5; 325 MG/1; MG/1
1 TABLET ORAL EVERY 4 HOURS PRN
Status: DISCONTINUED | OUTPATIENT
Start: 2020-01-15 | End: 2020-01-16 | Stop reason: HOSPADM

## 2020-01-15 RX ORDER — IBUPROFEN 800 MG/1
800 TABLET ORAL EVERY 8 HOURS PRN
Qty: 30 TABLET | Refills: 0 | Status: SHIPPED | OUTPATIENT
Start: 2020-01-15

## 2020-01-15 RX ORDER — LOSARTAN POTASSIUM 25 MG/1
25 TABLET ORAL DAILY
Status: DISCONTINUED | OUTPATIENT
Start: 2020-01-16 | End: 2020-01-16

## 2020-01-15 RX ORDER — TAMSULOSIN HYDROCHLORIDE 0.4 MG/1
0.4 CAPSULE ORAL DAILY
Qty: 5 CAPSULE | Refills: 0 | Status: ON HOLD | OUTPATIENT
Start: 2020-01-15 | End: 2020-01-16 | Stop reason: HOSPADM

## 2020-01-15 RX ORDER — HYDROCODONE BITARTRATE AND ACETAMINOPHEN 5; 325 MG/1; MG/1
2 TABLET ORAL EVERY 4 HOURS PRN
Status: DISCONTINUED | OUTPATIENT
Start: 2020-01-15 | End: 2020-01-16 | Stop reason: HOSPADM

## 2020-01-15 RX ORDER — SODIUM CHLORIDE 0.9 % (FLUSH) 0.9 %
10 SYRINGE (ML) INJECTION PRN
Status: DISCONTINUED | OUTPATIENT
Start: 2020-01-15 | End: 2020-01-16 | Stop reason: HOSPADM

## 2020-01-15 RX ORDER — PANTOPRAZOLE SODIUM 40 MG/1
40 TABLET, DELAYED RELEASE ORAL
Status: DISCONTINUED | OUTPATIENT
Start: 2020-01-16 | End: 2020-01-16

## 2020-01-15 RX ORDER — TAMSULOSIN HYDROCHLORIDE 0.4 MG/1
0.4 CAPSULE ORAL ONCE
Status: COMPLETED | OUTPATIENT
Start: 2020-01-15 | End: 2020-01-15

## 2020-01-15 RX ADMIN — SODIUM CHLORIDE 500 ML: 9 INJECTION, SOLUTION INTRAVENOUS at 23:00

## 2020-01-15 RX ADMIN — MORPHINE SULFATE 4 MG: 4 INJECTION INTRAVENOUS at 17:40

## 2020-01-15 RX ADMIN — SODIUM CHLORIDE: 9 INJECTION, SOLUTION INTRAVENOUS at 19:23

## 2020-01-15 RX ADMIN — KETOROLAC TROMETHAMINE 30 MG: 30 INJECTION, SOLUTION INTRAMUSCULAR; INTRAVENOUS at 22:51

## 2020-01-15 RX ADMIN — SODIUM CHLORIDE 1000 ML: 9 INJECTION, SOLUTION INTRAVENOUS at 06:56

## 2020-01-15 RX ADMIN — KETOROLAC TROMETHAMINE 30 MG: 30 INJECTION, SOLUTION INTRAMUSCULAR at 06:06

## 2020-01-15 RX ADMIN — CIPROFLOXACIN 400 MG: 2 INJECTION, SOLUTION INTRAVENOUS at 21:28

## 2020-01-15 RX ADMIN — HYDROMORPHONE HYDROCHLORIDE 1 MG: 1 INJECTION, SOLUTION INTRAMUSCULAR; INTRAVENOUS; SUBCUTANEOUS at 19:25

## 2020-01-15 RX ADMIN — ONDANSETRON 4 MG: 2 INJECTION INTRAMUSCULAR; INTRAVENOUS at 16:41

## 2020-01-15 RX ADMIN — KETOROLAC TROMETHAMINE 30 MG: 30 INJECTION, SOLUTION INTRAMUSCULAR; INTRAVENOUS at 16:41

## 2020-01-15 RX ADMIN — HYDROCODONE BITARTRATE AND ACETAMINOPHEN 2 TABLET: 5; 325 TABLET ORAL at 21:33

## 2020-01-15 RX ADMIN — SODIUM CHLORIDE 500 ML: 9 INJECTION, SOLUTION INTRAVENOUS at 16:40

## 2020-01-15 RX ADMIN — SODIUM CHLORIDE 1000 ML: 9 INJECTION, SOLUTION INTRAVENOUS at 06:02

## 2020-01-15 RX ADMIN — ONDANSETRON 4 MG: 2 INJECTION INTRAMUSCULAR; INTRAVENOUS at 19:23

## 2020-01-15 RX ADMIN — ONDANSETRON 4 MG: 2 INJECTION INTRAMUSCULAR; INTRAVENOUS at 06:02

## 2020-01-15 RX ADMIN — TAMSULOSIN HYDROCHLORIDE 0.4 MG: 0.4 CAPSULE ORAL at 06:55

## 2020-01-15 RX ADMIN — MORPHINE SULFATE 4 MG: 4 INJECTION INTRAVENOUS at 07:28

## 2020-01-15 RX ADMIN — SODIUM CHLORIDE: 9 INJECTION, SOLUTION INTRAVENOUS at 21:22

## 2020-01-15 RX ADMIN — Medication 10 ML: at 22:51

## 2020-01-15 ASSESSMENT — PAIN SCALES - GENERAL
PAINLEVEL_OUTOF10: 10
PAINLEVEL_OUTOF10: 5
PAINLEVEL_OUTOF10: 5
PAINLEVEL_OUTOF10: 6
PAINLEVEL_OUTOF10: 3
PAINLEVEL_OUTOF10: 5
PAINLEVEL_OUTOF10: 7
PAINLEVEL_OUTOF10: 7
PAINLEVEL_OUTOF10: 8
PAINLEVEL_OUTOF10: 7
PAINLEVEL_OUTOF10: 10
PAINLEVEL_OUTOF10: 4
PAINLEVEL_OUTOF10: 6

## 2020-01-15 ASSESSMENT — PAIN DESCRIPTION - PAIN TYPE
TYPE: ACUTE PAIN

## 2020-01-15 ASSESSMENT — ENCOUNTER SYMPTOMS
SHORTNESS OF BREATH: 0
COUGH: 0
ABDOMINAL PAIN: 1
VOMITING: 1
NAUSEA: 1

## 2020-01-15 ASSESSMENT — PAIN DESCRIPTION - ONSET: ONSET: ON-GOING

## 2020-01-15 ASSESSMENT — PAIN DESCRIPTION - LOCATION
LOCATION: FLANK
LOCATION: ABDOMEN;FLANK
LOCATION: FLANK

## 2020-01-15 ASSESSMENT — PAIN DESCRIPTION - DESCRIPTORS
DESCRIPTORS: DULL
DESCRIPTORS: SHARP

## 2020-01-15 ASSESSMENT — PAIN DESCRIPTION - FREQUENCY
FREQUENCY: CONTINUOUS
FREQUENCY: INTERMITTENT

## 2020-01-15 ASSESSMENT — PAIN DESCRIPTION - PROGRESSION: CLINICAL_PROGRESSION: GRADUALLY IMPROVING

## 2020-01-15 ASSESSMENT — PAIN DESCRIPTION - ORIENTATION
ORIENTATION: RIGHT

## 2020-01-15 NOTE — LETTER
[facility]  [address] [place] [address]  Phone: [phone]             January 16, 2020    Patient: Pawan Barrios   YOB: 1980   Date of Visit: 1/15/2020       To Whom It May Concern:    Pawan Barrios was seen and treated in our facility  beginning 1/15/2020 until 1/16/2020. He may return to work on 1/20/2020.       Sincerely,       Nell Moreland         Signature:__________________________________

## 2020-01-15 NOTE — ED PROVIDER NOTES
History of kidney stones and Hypertension. SURGICAL HISTORY      has a past surgical history that includes Tonsillectomy; Upper gastrointestinal endoscopy (05/26/2009); hernia repair (Left, 07/27/2016); pr egd transoral biopsy single/multiple (N/A, 12/8/2017); pr colonoscopy w/biopsy single/multiple (N/A, 12/8/2017); Upper gastrointestinal endoscopy (12/08/2017); Colonoscopy (12/08/2017); Upper gastrointestinal endoscopy (N/A, 7/6/2018); and Upper gastrointestinal endoscopy (N/A, 7/19/2019). CURRENT MEDICATIONS       Previous Medications    BUPROPION (WELLBUTRIN XL) 300 MG EXTENDED RELEASE TABLET    Take 1 tablet by mouth every morning    CHOLECALCIFEROL (VITAMIN D3) 82216 UNITS TABS    One tab weekly    HYDROCODONE-ACETAMINOPHEN (NORCO) 5-325 MG PER TABLET    Take 1 tablet by mouth every 8 hours as needed for Pain for up to 7 days. IBUPROFEN (ADVIL;MOTRIN) 800 MG TABLET    Take 1 tablet by mouth every 8 hours as needed for Pain    LOSARTAN (COZAAR) 25 MG TABLET    Take 25 mg by mouth daily    OMEPRAZOLE (PRILOSEC) 40 MG DELAYED RELEASE CAPSULE    take 1 capsule by mouth once daily    ONDANSETRON (ZOFRAN ODT) 4 MG DISINTEGRATING TABLET    Take 1 tablet by mouth every 8 hours as needed for Nausea    RA ALLERGY RELIEF 10 MG TABLET    1 tablet daily    TAMSULOSIN (FLOMAX) 0.4 MG CAPSULE    Take 1 capsule by mouth daily for 5 doses       ALLERGIES     is allergic to naproxen. FAMILY HISTORY     He indicated that his mother is alive. He indicated that his father is alive. He indicated that his brother is alive. family history includes Cancer in his mother; High Cholesterol in his brother and father. SOCIAL HISTORY      reports that he has never smoked. He has quit using smokeless tobacco.  His smokeless tobacco use included chew. He reports that he does not drink alcohol or use drugs. PHYSICAL EXAM     INITIAL VITALS:  height is 6' 3\" (1.905 m) and weight is 113.4 kg (250 lb).  His oral temperature images are provided for review. Dose modulation, iterative reconstruction, and/or weight based adjustment of the mA/kV was utilized to reduce the radiation dose to as low as reasonably achievable. COMPARISON: None. HISTORY: ORDERING SYSTEM PROVIDED HISTORY: right flank pain TECHNOLOGIST PROVIDED HISTORY: right flank pain Reason for Exam: flank pain Acuity: Acute Type of Exam: Initial FINDINGS: Lower Chest: Mild bilateral lower lobe dependent atelectatic changes. There is no basilar pericardial or pleural effusion. Organs: Lack of intravenous contrast limits evaluation of the solid organs and bowel. Liver is normal in morphology. Normal gallbladder. Spleen, adrenal glands, and pancreas are normal. Moderate right hydroureteronephrosis to the level of a 4 mm calculus in the mid right ureter. No additional right renal or ureteral calculi. No left renal or ureteral calculi. No left hydronephrosis. GI/Bowel: Stomach, small bowel, and colon are nondilated. Normal appendix. Pelvis: Urinary bladder and pelvic organs are unremarkable. No free fluid in the pelvis. Peritoneum/Retroperitoneum:  Abdominal aorta is normal in caliber. No free fluid in the abdomen. Bones/Soft Tissues: No acute osseous abnormality. Moderate right hydroureteronephrosis to the level of a 4 mm calculus in the mid right ureter. LABS:  No results found for this visit on 01/15/20.       EMERGENCY DEPARTMENT COURSE:   Vitals:    Vitals:    01/15/20 1600   BP: (!) 158/107   Pulse: 112   Resp: 18   Temp: 98.4 °F (36.9 °C)   TempSrc: Oral   SpO2: 94%   Weight: 113.4 kg (250 lb)   Height: 6' 3\" (1.905 m)     -------------------------  BP: (!) 158/107, Temp: 98.4 °F (36.9 °C), Pulse: 112, Resp: 18    Orders Placed This Encounter   Medications    0.9 % sodium chloride bolus    0.9 % sodium chloride infusion    ketorolac (TORADOL) injection 30 mg    ondansetron (ZOFRAN) injection 4 mg    morphine injection 4 mg    HYDROmorphone

## 2020-01-15 NOTE — ED PROVIDER NOTES
No free fluid in the pelvis. Peritoneum/Retroperitoneum:  Abdominal aorta is normal in caliber. No free fluid in the abdomen. Bones/Soft Tissues: No acute osseous abnormality. Moderate right hydroureteronephrosis to the level of a 4 mm calculus in the mid right ureter.      LABS:   Results for orders placed or performed during the hospital encounter of 01/15/20   CBC   Result Value Ref Range    WBC 10.2 3.5 - 11.0 k/uL    RBC 4.77 4.5 - 5.9 m/uL    Hemoglobin 15.3 13.5 - 17.5 g/dL    Hematocrit 45.0 41 - 53 %    MCV 94.2 80 - 100 fL    MCH 32.1 26 - 34 pg    MCHC 34.1 31 - 37 g/dL    RDW 13.9 12.5 - 15.4 %    Platelets 603 865 - 898 k/uL    MPV 7.7 6.0 - 12.0 fL    NRBC Automated NOT REPORTED per 100 WBC   Basic Metabolic Panel   Result Value Ref Range    Glucose 189 (H) 70 - 99 mg/dL    BUN 11 6 - 20 mg/dL    CREATININE 1.01 0.70 - 1.20 mg/dL    Bun/Cre Ratio NOT REPORTED 9 - 20    Calcium 8.8 8.6 - 10.4 mg/dL    Sodium 137 135 - 144 mmol/L    Potassium 3.8 3.7 - 5.3 mmol/L    Chloride 99 98 - 107 mmol/L    CO2 23 20 - 31 mmol/L    Anion Gap 15 9 - 17 mmol/L    GFR Non-African American >60 >60 mL/min    GFR African American >60 >60 mL/min    GFR Comment          GFR Staging NOT REPORTED    Hepatic Function Panel   Result Value Ref Range    Alb 4.0 3.5 - 5.2 g/dL    Alkaline Phosphatase 103 40 - 129 U/L    ALT 41 5 - 41 U/L    AST 26 <40 U/L    Total Bilirubin 0.30 0.3 - 1.2 mg/dL    Bilirubin, Direct 0.13 <0.31 mg/dL    Bilirubin, Indirect 0.17 0.00 - 1.00 mg/dL    Total Protein 7.2 6.4 - 8.3 g/dL    Globulin NOT REPORTED 1.5 - 3.8 g/dL    Albumin/Globulin Ratio 1.3 1.0 - 2.5   Lipase   Result Value Ref Range    Lipase 15 13 - 60 U/L   Urinalysis Reflex to Culture   Result Value Ref Range    Color, UA YELLOW YELLOW    Turbidity UA CLEAR CLEAR    Glucose, Ur NEGATIVE NEGATIVE    Bilirubin Urine NEGATIVE NEGATIVE    Ketones, Urine NEGATIVE NEGATIVE    Specific Gravity, UA 1.020 1.005 - 1.030    Urine Hgb LARGE (A) NEGATIVE    pH, UA 5.5 5.0 - 8.0    Protein, UA NEGATIVE NEGATIVE    Urobilinogen, Urine Normal Normal    Nitrite, Urine NEGATIVE NEGATIVE    Leukocyte Esterase, Urine TRACE (A) NEGATIVE    Urinalysis Comments NOT REPORTED    Microscopic Urinalysis   Result Value Ref Range    -          WBC, UA 2 TO 5 0 - 5 /HPF    RBC, UA TOO NUMEROUS TO COUNT 0 - 2 /HPF    Casts UA NOT REPORTED /LPF    Crystals UA NOT REPORTED None /HPF    Epithelial Cells UA None 0 - 5 /HPF    Renal Epithelial, Urine NOT REPORTED 0 /HPF    Bacteria, UA FEW (A) None    Mucus, UA 1+ (A) None    Trichomonas, UA NOT REPORTED None    Amorphous, UA NOT REPORTED None    Other Observations UA Culture ordered based on defined criteria. (A) NOT REQ. Yeast, UA NOT REPORTED None       RECENT VITALS:  BP: (!) 138/95, Temp: 98 °F (36.7 °C), Pulse: 76, Resp: 16     ED Course     The patient was given the following medications:  Orders Placed This Encounter   Medications    0.9 % sodium chloride bolus    ondansetron (ZOFRAN) injection 4 mg    ketorolac (TORADOL) injection 30 mg    0.9 % sodium chloride bolus    tamsulosin (FLOMAX) capsule 0.4 mg    ibuprofen (ADVIL;MOTRIN) 800 MG tablet     Sig: Take 1 tablet by mouth every 8 hours as needed for Pain     Dispense:  30 tablet     Refill:  0    tamsulosin (FLOMAX) 0.4 MG capsule     Sig: Take 1 capsule by mouth daily for 5 doses     Dispense:  5 capsule     Refill:  0    HYDROcodone-acetaminophen (NORCO) 5-325 MG per tablet     Sig: Take 1 tablet by mouth every 8 hours as needed for Pain for up to 7 days. Dispense:  10 tablet     Refill:  0    ondansetron (ZOFRAN ODT) 4 MG disintegrating tablet     Sig: Take 1 tablet by mouth every 8 hours as needed for Nausea     Dispense:  10 tablet     Refill:  0    morphine injection 4 mg     During the emergency department course, patient was initially given normal saline bolus, Toradol, Zofran and Flomax.   His pain was well controlled but later on he

## 2020-01-15 NOTE — ED NOTES
Pt presents due to right flank pain. Pt was seen here earlier today and dx with kidney stone of right kidney. Pt sts since going home he vomited once and had increased pain that he sts the norco did not relieve, although the pain has subsided some since. Pt is A&O and breathing is even and non labored, afebrile.       Jan Robertson RN  01/15/20 6743

## 2020-01-15 NOTE — ED PROVIDER NOTES
[address] [facility] ED  [address] [facility] [address]  Phone: [phone]  Fax: [phone]      Pt Name: Kenya Farrar  FNM:[id number]  [place] 1980  Date of evaluation: 1/15/2020      CHIEF COMPLAINT       Chief Complaint   Patient presents with    Flank Pain     right side    Abdominal Pain     right side       HISTORY OF PRESENT ILLNESS   Kenya Farrar is a 44 y.o. male with history of kidney stones, GERD, hypertension, eosinophilic esophagitis, hernia repair, and anxiety who presents for evaluation of right flank pain. The patient reports that approximately 3 hours ago he woke up with gradual onset, constant, progressive, sharp, stabbing, nonradiating, right flank pain. He has not taken any medications for pain and does not list any provoking or palliating factors. The patient states that he is unable to get comfortable secondary to the pain. He states that his symptoms feel similar to when he passed a kidney stone 5 years ago. He does not have a urologist and has never required any lithotripsy or procedure for kidney stone passage. The patient denies fever, chills, headache, vision changes, neck pain, chest pain, shortness of breath, abdominal abdominal pain (although this is listed in the chief complaint), urinary/bowel symptoms, focal weakness, numbness, tingling, penile discharge, scrotal swelling, recent injury or illness. REVIEW OF SYSTEMS     Ten point review of systems was reviewed and is negative unless otherwise noted in the HPI    [address]    has a past medical history of History of kidney stones and Hypertension. SURGICAL HISTORY      has a past surgical history that includes Tonsillectomy; Upper gastrointestinal endoscopy (05/26/2009); hernia repair (Left, 07/27/2016); pr egd transoral biopsy single/multiple (N/A, 12/8/2017); pr colonoscopy w/biopsy single/multiple (N/A, 12/8/2017); Upper gastrointestinal endoscopy (12/08/2017);  Colonoscopy (12/08/2017); Upper gastrointestinal endoscopy (N/A, 7/6/2018); and Upper gastrointestinal endoscopy (N/A, 7/19/2019). CURRENT MEDICATIONS       Previous Medications    BUPROPION (WELLBUTRIN XL) 300 MG EXTENDED RELEASE TABLET    Take 1 tablet by mouth every morning    CHOLECALCIFEROL (VITAMIN D3) 71223 UNITS TABS    One tab weekly    LOSARTAN (COZAAR) 25 MG TABLET    Take 25 mg by mouth daily    OMEPRAZOLE (PRILOSEC) 40 MG DELAYED RELEASE CAPSULE    take 1 capsule by mouth once daily    RA ALLERGY RELIEF 10 MG TABLET    1 tablet daily       ALLERGIES     is allergic to naproxen. FAMILY HISTORY     He indicated that his mother is alive. He indicated that his father is alive. He indicated that his brother is alive. family history includes Cancer in his mother; High Cholesterol in his brother and father. SOCIAL HISTORY      reports that he has never smoked. He has quit using smokeless tobacco.  His smokeless tobacco use included chew. He reports that he does not drink alcohol or use drugs. I counseled the patient against using tobacco products. PHYSICAL EXAM     INITIAL VITALS:  oral temperature is 98 °F (36.7 °C). His blood pressure is 138/95 (abnormal) and his pulse is 76. His respiration is 16 and oxygen saturation is 96%. CONSTITUTIONAL: Moderate distress, nontoxic, pacing  SKIN: warm, dry, no jaundice, hives or petechiae  EYES: clear conjunctiva, non-icteric sclera  HENT: normocephalic, atraumatic, moist mucus membranes  NECK: Nontender and supple with no nuchal rigidity, full range of motion  PULMONARY: clear to auscultation without wheezes, rhonchi, or rales, normal excursion, no accessory muscle use and no stridor  CARDIOVASCULAR: regular rate, rhythm. Strong radial pulses with intact distal perfusion. Capillary refill <2 seconds.   GASTROINTESTINAL: soft, non-tender, no pain or McBurney's point, negative Teresa sign, non-distended, no palpable masses, no rebound or guarding GENITOURINARY: Right costovertebral angle tenderness to palpation, no left CVA tenderness  MUSCULOSKELETAL: No midline spinal tenderness, step off or deformity. Extremities are otherwise nontender to palpation and nonerythematous. Compartments soft. No peripheral edema. NEUROLOGIC: alert and oriented x 3, GCS 15, normal mentation and speech. Moves all extremities x 4 without motor or sensory deficit, gait is stable without ataxia  PSYCHIATRIC: normal mood and affect, thought process is clear and linear    DIAGNOSTIC RESULTS     EKG:  None    RADIOLOGY:   Ct Abdomen Pelvis Wo Contrast    Result Date: 1/15/2020  EXAMINATION: CT OF THE ABDOMEN AND PELVIS WITHOUT CONTRAST 1/15/2020 5:55 am TECHNIQUE: CT of the abdomen and pelvis was performed without the administration of intravenous contrast. Multiplanar reformatted images are provided for review. Dose modulation, iterative reconstruction, and/or weight based adjustment of the mA/kV was utilized to reduce the radiation dose to as low as reasonably achievable. COMPARISON: None. HISTORY: ORDERING SYSTEM PROVIDED HISTORY: right flank pain TECHNOLOGIST PROVIDED HISTORY: right flank pain Reason for Exam: flank pain Acuity: Acute Type of Exam: Initial FINDINGS: Lower Chest: Mild bilateral lower lobe dependent atelectatic changes. There is no basilar pericardial or pleural effusion. Organs: Lack of intravenous contrast limits evaluation of the solid organs and bowel. Liver is normal in morphology. Normal gallbladder. Spleen, adrenal glands, and pancreas are normal. Moderate right hydroureteronephrosis to the level of a 4 mm calculus in the mid right ureter. No additional right renal or ureteral calculi. No left renal or ureteral calculi. No left hydronephrosis. GI/Bowel: Stomach, small bowel, and colon are nondilated. Normal appendix. Pelvis: Urinary bladder and pelvic organs are unremarkable. No free fluid in the pelvis.  Peritoneum/Retroperitoneum:  Abdominal aorta is normal in caliber. No free fluid in the abdomen. Bones/Soft Tissues: No acute osseous abnormality. Moderate right hydroureteronephrosis to the level of a 4 mm calculus in the mid right ureter.        LABS:  Results for orders placed or performed during the hospital encounter of 01/15/20   CBC   Result Value Ref Range    WBC 10.2 3.5 - 11.0 k/uL    RBC 4.77 4.5 - 5.9 m/uL    Hemoglobin 15.3 13.5 - 17.5 g/dL    Hematocrit 45.0 41 - 53 %    MCV 94.2 80 - 100 fL    MCH 32.1 26 - 34 pg    MCHC 34.1 31 - 37 g/dL    RDW 13.9 12.5 - 15.4 %    Platelets 457 124 - 419 k/uL    MPV 7.7 6.0 - 12.0 fL    NRBC Automated NOT REPORTED per 100 WBC   Basic Metabolic Panel   Result Value Ref Range    Glucose 189 (H) 70 - 99 mg/dL    BUN 11 6 - 20 mg/dL    CREATININE 1.01 0.70 - 1.20 mg/dL    Bun/Cre Ratio NOT REPORTED 9 - 20    Calcium 8.8 8.6 - 10.4 mg/dL    Sodium 137 135 - 144 mmol/L    Potassium 3.8 3.7 - 5.3 mmol/L    Chloride 99 98 - 107 mmol/L    CO2 23 20 - 31 mmol/L    Anion Gap 15 9 - 17 mmol/L    GFR Non-African American >60 >60 mL/min    GFR African American >60 >60 mL/min    GFR Comment          GFR Staging NOT REPORTED    Hepatic Function Panel   Result Value Ref Range    Alb 4.0 3.5 - 5.2 g/dL    Alkaline Phosphatase 103 40 - 129 U/L    ALT 41 5 - 41 U/L    AST 26 <40 U/L    Total Bilirubin 0.30 0.3 - 1.2 mg/dL    Bilirubin, Direct 0.13 <0.31 mg/dL    Bilirubin, Indirect 0.17 0.00 - 1.00 mg/dL    Total Protein 7.2 6.4 - 8.3 g/dL    Globulin NOT REPORTED 1.5 - 3.8 g/dL    Albumin/Globulin Ratio 1.3 1.0 - 2.5   Lipase   Result Value Ref Range    Lipase 15 13 - 60 U/L       EMERGENCY DEPARTMENT COURSE:        The patient was given the following medications:  Orders Placed This Encounter   Medications    0.9 % sodium chloride bolus    ondansetron (ZOFRAN) injection 4 mg    ketorolac (TORADOL) injection 30 mg    0.9 % sodium chloride bolus    tamsulosin (FLOMAX) capsule 0.4 mg    ibuprofen (ADVIL;MOTRIN) 800 MG tablet     Sig: Take 1 tablet by mouth every 8 hours as needed for Pain     Dispense:  30 tablet     Refill:  0    tamsulosin (FLOMAX) 0.4 MG capsule     Sig: Take 1 capsule by mouth daily for 5 doses     Dispense:  5 capsule     Refill:  0    HYDROcodone-acetaminophen (NORCO) 5-325 MG per tablet     Sig: Take 1 tablet by mouth every 8 hours as needed for Pain for up to 7 days. Dispense:  10 tablet     Refill:  0    ondansetron (ZOFRAN ODT) 4 MG disintegrating tablet     Sig: Take 1 tablet by mouth every 8 hours as needed for Nausea     Dispense:  10 tablet     Refill:  0        Vitals:    Vitals:    01/15/20 0548 01/15/20 0629 01/15/20 0655   BP:  (!) 136/113 (!) 138/95   Pulse: 81  76   Resp: 18  16   Temp: 98 °F (36.7 °C)     TempSrc: Oral     SpO2: 94%  96%     -------------------------  BP: (!) 138/95, Temp: 98 °F (36.7 °C), Pulse: 76, Resp: 16    CONSULTS:  None    CRITICAL CARE:   None    PROCEDURES:  None    DIAGNOSIS/ MDM:   Jayne Farr is a 44 y.o. male who presents with right flank pain. Vital signs are stable. Heart regular rate and rhythm. Lungs clear to auscultation. Abdomen soft nontender. He has right CVA tenderness to palpation. CBC, BMP, LFTs and lipase are unremarkable. Urinalysis is pending. CT abdomen pelvis shows moderate right hydroureteronephrosis secondary to a 4 mm calculus in the mid right ureter. The patient was treated with IV fluids, Toradol, Zofran and Flomax with improvement in his pain. I suspect his symptoms are secondary to his ureteral stone. Urinalysis is pending to determine if there is any signs of infection. The patient will be signed out to Dr. Madi Moreland to follow-up on urinalysis results and start antibiotics if needed. The patient will be instructed to take ibuprofen or Tylenol as needed for pain and to take Flomax as prescribed. Will be given a prescription for Norco for breakthrough pain.   He will be given a prescription for Zofran to help

## 2020-01-16 ENCOUNTER — ANESTHESIA EVENT (OUTPATIENT)
Dept: OPERATING ROOM | Age: 40
DRG: 661 | End: 2020-01-16
Payer: COMMERCIAL

## 2020-01-16 ENCOUNTER — ANESTHESIA (OUTPATIENT)
Dept: OPERATING ROOM | Age: 40
DRG: 661 | End: 2020-01-16
Payer: COMMERCIAL

## 2020-01-16 ENCOUNTER — APPOINTMENT (OUTPATIENT)
Dept: GENERAL RADIOLOGY | Age: 40
DRG: 661 | End: 2020-01-16
Payer: COMMERCIAL

## 2020-01-16 VITALS
OXYGEN SATURATION: 94 % | SYSTOLIC BLOOD PRESSURE: 138 MMHG | TEMPERATURE: 97.4 F | RESPIRATION RATE: 20 BRPM | HEART RATE: 107 BPM | WEIGHT: 267 LBS | HEIGHT: 75 IN | BODY MASS INDEX: 33.2 KG/M2 | DIASTOLIC BLOOD PRESSURE: 85 MMHG

## 2020-01-16 VITALS — DIASTOLIC BLOOD PRESSURE: 100 MMHG | OXYGEN SATURATION: 96 % | TEMPERATURE: 97.2 F | SYSTOLIC BLOOD PRESSURE: 151 MMHG

## 2020-01-16 PROBLEM — E83.51 HYPOCALCEMIA: Status: ACTIVE | Noted: 2020-01-16

## 2020-01-16 PROBLEM — N20.0 NEPHROLITHIASIS: Status: ACTIVE | Noted: 2020-01-16

## 2020-01-16 LAB
ANION GAP SERPL CALCULATED.3IONS-SCNC: 11 MMOL/L (ref 9–17)
BUN BLDV-MCNC: 7 MG/DL (ref 6–20)
BUN/CREAT BLD: ABNORMAL (ref 9–20)
CALCIUM IONIZED: 1.23 MMOL/L (ref 1.13–1.33)
CALCIUM SERPL-MCNC: 8.2 MG/DL (ref 8.6–10.4)
CHLORIDE BLD-SCNC: 104 MMOL/L (ref 98–107)
CO2: 24 MMOL/L (ref 20–31)
CREAT SERPL-MCNC: 1.03 MG/DL (ref 0.7–1.2)
CULTURE: NO GROWTH
GFR AFRICAN AMERICAN: >60 ML/MIN
GFR NON-AFRICAN AMERICAN: >60 ML/MIN
GFR SERPL CREATININE-BSD FRML MDRD: ABNORMAL ML/MIN/{1.73_M2}
GFR SERPL CREATININE-BSD FRML MDRD: ABNORMAL ML/MIN/{1.73_M2}
GLUCOSE BLD-MCNC: 112 MG/DL (ref 70–99)
HCT VFR BLD CALC: 40.3 % (ref 41–53)
HEMOGLOBIN: 13.6 G/DL (ref 13.5–17.5)
Lab: NORMAL
MCH RBC QN AUTO: 31.8 PG (ref 26–34)
MCHC RBC AUTO-ENTMCNC: 33.7 G/DL (ref 31–37)
MCV RBC AUTO: 94.4 FL (ref 80–100)
NRBC AUTOMATED: ABNORMAL PER 100 WBC
PDW BLD-RTO: 13.5 % (ref 12.5–15.4)
PLATELET # BLD: 231 K/UL (ref 140–450)
PMV BLD AUTO: 7.1 FL (ref 6–12)
POTASSIUM SERPL-SCNC: 4 MMOL/L (ref 3.7–5.3)
RBC # BLD: 4.27 M/UL (ref 4.5–5.9)
SODIUM BLD-SCNC: 139 MMOL/L (ref 135–144)
SPECIMEN DESCRIPTION: NORMAL
WBC # BLD: 11 K/UL (ref 3.5–11)

## 2020-01-16 PROCEDURE — 3700000000 HC ANESTHESIA ATTENDED CARE: Performed by: UROLOGY

## 2020-01-16 PROCEDURE — 6360000002 HC RX W HCPCS: Performed by: NURSE PRACTITIONER

## 2020-01-16 PROCEDURE — 0T768DZ DILATION OF RIGHT URETER WITH INTRALUMINAL DEVICE, VIA NATURAL OR ARTIFICIAL OPENING ENDOSCOPIC: ICD-10-PCS | Performed by: UROLOGY

## 2020-01-16 PROCEDURE — 6370000000 HC RX 637 (ALT 250 FOR IP): Performed by: UROLOGY

## 2020-01-16 PROCEDURE — 6370000000 HC RX 637 (ALT 250 FOR IP): Performed by: CLINICAL NURSE SPECIALIST

## 2020-01-16 PROCEDURE — 6360000002 HC RX W HCPCS: Performed by: UROLOGY

## 2020-01-16 PROCEDURE — 2709999900 HC NON-CHARGEABLE SUPPLY: Performed by: UROLOGY

## 2020-01-16 PROCEDURE — 3209999900 FLUORO FOR SURGICAL PROCEDURES

## 2020-01-16 PROCEDURE — 3700000001 HC ADD 15 MINUTES (ANESTHESIA): Performed by: UROLOGY

## 2020-01-16 PROCEDURE — 85027 COMPLETE CBC AUTOMATED: CPT

## 2020-01-16 PROCEDURE — 6370000000 HC RX 637 (ALT 250 FOR IP): Performed by: NURSE PRACTITIONER

## 2020-01-16 PROCEDURE — 7100000001 HC PACU RECOVERY - ADDTL 15 MIN: Performed by: UROLOGY

## 2020-01-16 PROCEDURE — 3600000002 HC SURGERY LEVEL 2 BASE: Performed by: UROLOGY

## 2020-01-16 PROCEDURE — 87086 URINE CULTURE/COLONY COUNT: CPT

## 2020-01-16 PROCEDURE — APPSS45 APP SPLIT SHARED TIME 31-45 MINUTES: Performed by: CLINICAL NURSE SPECIALIST

## 2020-01-16 PROCEDURE — 36415 COLL VENOUS BLD VENIPUNCTURE: CPT

## 2020-01-16 PROCEDURE — 6360000002 HC RX W HCPCS

## 2020-01-16 PROCEDURE — C2617 STENT, NON-COR, TEM W/O DEL: HCPCS | Performed by: UROLOGY

## 2020-01-16 PROCEDURE — C1769 GUIDE WIRE: HCPCS | Performed by: UROLOGY

## 2020-01-16 PROCEDURE — 6360000002 HC RX W HCPCS: Performed by: CLINICAL NURSE SPECIALIST

## 2020-01-16 PROCEDURE — 6360000002 HC RX W HCPCS: Performed by: ANESTHESIOLOGY

## 2020-01-16 PROCEDURE — 80048 BASIC METABOLIC PNL TOTAL CA: CPT

## 2020-01-16 PROCEDURE — 99222 1ST HOSP IP/OBS MODERATE 55: CPT | Performed by: INTERNAL MEDICINE

## 2020-01-16 PROCEDURE — 7100000000 HC PACU RECOVERY - FIRST 15 MIN: Performed by: UROLOGY

## 2020-01-16 PROCEDURE — 2580000003 HC RX 258: Performed by: ANESTHESIOLOGY

## 2020-01-16 PROCEDURE — 3600000012 HC SURGERY LEVEL 2 ADDTL 15MIN: Performed by: UROLOGY

## 2020-01-16 PROCEDURE — 82330 ASSAY OF CALCIUM: CPT

## 2020-01-16 PROCEDURE — C9113 INJ PANTOPRAZOLE SODIUM, VIA: HCPCS

## 2020-01-16 PROCEDURE — 2580000003 HC RX 258: Performed by: CLINICAL NURSE SPECIALIST

## 2020-01-16 PROCEDURE — 2580000003 HC RX 258: Performed by: NURSE PRACTITIONER

## 2020-01-16 PROCEDURE — 74018 RADEX ABDOMEN 1 VIEW: CPT

## 2020-01-16 DEVICE — URETERAL STENT
Type: IMPLANTABLE DEVICE | Site: URETER | Status: NON-FUNCTIONAL
Brand: POLARIS™ ULTRA
Removed: 2020-01-24

## 2020-01-16 RX ORDER — TAMSULOSIN HYDROCHLORIDE 0.4 MG/1
0.4 CAPSULE ORAL DAILY
Qty: 30 CAPSULE | Refills: 11 | Status: ON HOLD | OUTPATIENT
Start: 2020-01-16 | End: 2020-01-24 | Stop reason: SDUPTHER

## 2020-01-16 RX ORDER — SODIUM CHLORIDE 9 MG/ML
10 INJECTION INTRAVENOUS DAILY
Status: DISCONTINUED | OUTPATIENT
Start: 2020-01-16 | End: 2020-01-16 | Stop reason: HOSPADM

## 2020-01-16 RX ORDER — FENTANYL CITRATE 50 UG/ML
25 INJECTION, SOLUTION INTRAMUSCULAR; INTRAVENOUS EVERY 5 MIN PRN
Status: DISCONTINUED | OUTPATIENT
Start: 2020-01-16 | End: 2020-01-16

## 2020-01-16 RX ORDER — MORPHINE SULFATE 1 MG/ML
1 INJECTION, SOLUTION EPIDURAL; INTRATHECAL; INTRAVENOUS EVERY 5 MIN PRN
Status: DISCONTINUED | OUTPATIENT
Start: 2020-01-16 | End: 2020-01-16

## 2020-01-16 RX ORDER — PANTOPRAZOLE SODIUM 40 MG/10ML
INJECTION, POWDER, LYOPHILIZED, FOR SOLUTION INTRAVENOUS
Status: COMPLETED
Start: 2020-01-16 | End: 2020-01-16

## 2020-01-16 RX ORDER — PROPOFOL 10 MG/ML
INJECTION, EMULSION INTRAVENOUS CONTINUOUS PRN
Status: DISCONTINUED | OUTPATIENT
Start: 2020-01-16 | End: 2020-01-16 | Stop reason: SDUPTHER

## 2020-01-16 RX ORDER — HYDROCODONE BITARTRATE AND ACETAMINOPHEN 5; 325 MG/1; MG/1
1 TABLET ORAL PRN
Status: DISCONTINUED | OUTPATIENT
Start: 2020-01-16 | End: 2020-01-16

## 2020-01-16 RX ORDER — HYDRALAZINE HYDROCHLORIDE 20 MG/ML
5 INJECTION INTRAMUSCULAR; INTRAVENOUS ONCE
Status: COMPLETED | OUTPATIENT
Start: 2020-01-16 | End: 2020-01-16

## 2020-01-16 RX ORDER — PROMETHAZINE HYDROCHLORIDE 25 MG/ML
6.25 INJECTION, SOLUTION INTRAMUSCULAR; INTRAVENOUS
Status: DISCONTINUED | OUTPATIENT
Start: 2020-01-16 | End: 2020-01-16

## 2020-01-16 RX ORDER — CEFAZOLIN SODIUM 1 G/3ML
INJECTION, POWDER, FOR SOLUTION INTRAMUSCULAR; INTRAVENOUS PRN
Status: DISCONTINUED | OUTPATIENT
Start: 2020-01-16 | End: 2020-01-16 | Stop reason: SDUPTHER

## 2020-01-16 RX ORDER — OXYBUTYNIN CHLORIDE 5 MG/1
10 TABLET, EXTENDED RELEASE ORAL DAILY
Qty: 30 TABLET | Refills: 1 | Status: ON HOLD | OUTPATIENT
Start: 2020-01-16 | End: 2020-06-09 | Stop reason: ALTCHOICE

## 2020-01-16 RX ORDER — DIPHENHYDRAMINE HYDROCHLORIDE 50 MG/ML
12.5 INJECTION INTRAMUSCULAR; INTRAVENOUS
Status: DISCONTINUED | OUTPATIENT
Start: 2020-01-16 | End: 2020-01-16

## 2020-01-16 RX ORDER — PANTOPRAZOLE SODIUM 40 MG/10ML
40 INJECTION, POWDER, LYOPHILIZED, FOR SOLUTION INTRAVENOUS DAILY
Status: DISCONTINUED | OUTPATIENT
Start: 2020-01-16 | End: 2020-01-16 | Stop reason: HOSPADM

## 2020-01-16 RX ORDER — LIDOCAINE HYDROCHLORIDE 20 MG/ML
JELLY TOPICAL
Status: DISCONTINUED
Start: 2020-01-16 | End: 2020-01-16 | Stop reason: HOSPADM

## 2020-01-16 RX ORDER — HYDRALAZINE HYDROCHLORIDE 20 MG/ML
5 INJECTION INTRAMUSCULAR; INTRAVENOUS EVERY 10 MIN PRN
Status: DISCONTINUED | OUTPATIENT
Start: 2020-01-16 | End: 2020-01-16

## 2020-01-16 RX ORDER — OXYCODONE HYDROCHLORIDE AND ACETAMINOPHEN 5; 325 MG/1; MG/1
1 TABLET ORAL EVERY 6 HOURS PRN
Qty: 20 TABLET | Refills: 0 | Status: SHIPPED | OUTPATIENT
Start: 2020-01-16 | End: 2020-01-21

## 2020-01-16 RX ORDER — ONDANSETRON 2 MG/ML
4 INJECTION INTRAMUSCULAR; INTRAVENOUS
Status: DISCONTINUED | OUTPATIENT
Start: 2020-01-16 | End: 2020-01-16

## 2020-01-16 RX ORDER — PROPOFOL 10 MG/ML
INJECTION, EMULSION INTRAVENOUS PRN
Status: DISCONTINUED | OUTPATIENT
Start: 2020-01-16 | End: 2020-01-16 | Stop reason: SDUPTHER

## 2020-01-16 RX ORDER — MIDAZOLAM HYDROCHLORIDE 1 MG/ML
INJECTION INTRAMUSCULAR; INTRAVENOUS PRN
Status: DISCONTINUED | OUTPATIENT
Start: 2020-01-16 | End: 2020-01-16 | Stop reason: SDUPTHER

## 2020-01-16 RX ORDER — HYDRALAZINE HYDROCHLORIDE 20 MG/ML
10 INJECTION INTRAMUSCULAR; INTRAVENOUS EVERY 6 HOURS PRN
Status: DISCONTINUED | OUTPATIENT
Start: 2020-01-16 | End: 2020-01-16 | Stop reason: HOSPADM

## 2020-01-16 RX ORDER — FENTANYL CITRATE 50 UG/ML
INJECTION, SOLUTION INTRAMUSCULAR; INTRAVENOUS PRN
Status: DISCONTINUED | OUTPATIENT
Start: 2020-01-16 | End: 2020-01-16 | Stop reason: SDUPTHER

## 2020-01-16 RX ORDER — SODIUM CHLORIDE 9 MG/ML
INJECTION, SOLUTION INTRAVENOUS CONTINUOUS PRN
Status: DISCONTINUED | OUTPATIENT
Start: 2020-01-16 | End: 2020-01-16 | Stop reason: SDUPTHER

## 2020-01-16 RX ORDER — HYDROCODONE BITARTRATE AND ACETAMINOPHEN 5; 325 MG/1; MG/1
2 TABLET ORAL PRN
Status: DISCONTINUED | OUTPATIENT
Start: 2020-01-16 | End: 2020-01-16

## 2020-01-16 RX ORDER — LOSARTAN POTASSIUM 25 MG/1
25 TABLET ORAL DAILY
Status: DISCONTINUED | OUTPATIENT
Start: 2020-01-16 | End: 2020-01-16 | Stop reason: HOSPADM

## 2020-01-16 RX ADMIN — HYDRALAZINE HYDROCHLORIDE 5 MG: 20 INJECTION INTRAMUSCULAR; INTRAVENOUS at 10:18

## 2020-01-16 RX ADMIN — LOSARTAN POTASSIUM 25 MG: 25 TABLET, FILM COATED ORAL at 11:22

## 2020-01-16 RX ADMIN — CIPROFLOXACIN 400 MG: 2 INJECTION, SOLUTION INTRAVENOUS at 10:18

## 2020-01-16 RX ADMIN — SODIUM CHLORIDE: 9 INJECTION, SOLUTION INTRAVENOUS at 14:43

## 2020-01-16 RX ADMIN — PROPOFOL 50 MG: 10 INJECTION, EMULSION INTRAVENOUS at 08:39

## 2020-01-16 RX ADMIN — PROPOFOL 100 MCG/KG/MIN: 10 INJECTION, EMULSION INTRAVENOUS at 08:41

## 2020-01-16 RX ADMIN — BUPROPION HYDROCHLORIDE 300 MG: 150 TABLET, FILM COATED, EXTENDED RELEASE ORAL at 11:22

## 2020-01-16 RX ADMIN — SODIUM CHLORIDE: 9 INJECTION, SOLUTION INTRAVENOUS at 07:50

## 2020-01-16 RX ADMIN — ENOXAPARIN SODIUM 40 MG: 40 INJECTION SUBCUTANEOUS at 11:22

## 2020-01-16 RX ADMIN — Medication 10 ML: at 11:23

## 2020-01-16 RX ADMIN — MIDAZOLAM HYDROCHLORIDE 2 MG: 1 INJECTION, SOLUTION INTRAMUSCULAR; INTRAVENOUS at 08:36

## 2020-01-16 RX ADMIN — PROPOFOL 50 MG: 10 INJECTION, EMULSION INTRAVENOUS at 08:54

## 2020-01-16 RX ADMIN — PANTOPRAZOLE SODIUM 40 MG: 40 INJECTION, POWDER, LYOPHILIZED, FOR SOLUTION INTRAVENOUS at 11:23

## 2020-01-16 RX ADMIN — CEFAZOLIN 2000 MG: 1 INJECTION, POWDER, FOR SOLUTION INTRAMUSCULAR; INTRAVENOUS at 08:48

## 2020-01-16 RX ADMIN — TAMSULOSIN HYDROCHLORIDE 0.4 MG: 0.4 CAPSULE ORAL at 00:55

## 2020-01-16 RX ADMIN — KETOROLAC TROMETHAMINE 30 MG: 30 INJECTION, SOLUTION INTRAMUSCULAR; INTRAVENOUS at 04:23

## 2020-01-16 RX ADMIN — FENTANYL CITRATE 100 MCG: 50 INJECTION INTRAMUSCULAR; INTRAVENOUS at 08:37

## 2020-01-16 RX ADMIN — CETIRIZINE HYDROCHLORIDE 10 MG: 10 TABLET, FILM COATED ORAL at 11:22

## 2020-01-16 RX ADMIN — SODIUM CHLORIDE: 9 INJECTION, SOLUTION INTRAVENOUS at 04:27

## 2020-01-16 RX ADMIN — PANTOPRAZOLE SODIUM 40 MG: 40 INJECTION, POWDER, FOR SOLUTION INTRAVENOUS at 11:23

## 2020-01-16 RX ADMIN — KETOROLAC TROMETHAMINE 30 MG: 30 INJECTION, SOLUTION INTRAMUSCULAR; INTRAVENOUS at 10:19

## 2020-01-16 RX ADMIN — PROMETHAZINE HYDROCHLORIDE 12.5 MG: 25 INJECTION INTRAMUSCULAR; INTRAVENOUS at 11:06

## 2020-01-16 RX ADMIN — PROMETHAZINE HYDROCHLORIDE 12.5 MG: 25 INJECTION INTRAMUSCULAR; INTRAVENOUS at 04:22

## 2020-01-16 RX ADMIN — PROPOFOL 30 MG: 10 INJECTION, EMULSION INTRAVENOUS at 08:49

## 2020-01-16 RX ADMIN — PROPOFOL 50 MG: 10 INJECTION, EMULSION INTRAVENOUS at 08:42

## 2020-01-16 ASSESSMENT — PULMONARY FUNCTION TESTS
PIF_VALUE: 0
PIF_VALUE: 1
PIF_VALUE: 0

## 2020-01-16 ASSESSMENT — PAIN SCALES - GENERAL
PAINLEVEL_OUTOF10: 5
PAINLEVEL_OUTOF10: 5
PAINLEVEL_OUTOF10: 0
PAINLEVEL_OUTOF10: 5
PAINLEVEL_OUTOF10: 4
PAINLEVEL_OUTOF10: 0

## 2020-01-16 ASSESSMENT — PAIN - FUNCTIONAL ASSESSMENT: PAIN_FUNCTIONAL_ASSESSMENT: 0-10

## 2020-01-16 ASSESSMENT — PAIN DESCRIPTION - LOCATION: LOCATION: FLANK

## 2020-01-16 ASSESSMENT — ENCOUNTER SYMPTOMS
BACK PAIN: 1
SHORTNESS OF BREATH: 0
DIARRHEA: 0
NAUSEA: 1
COUGH: 0
BLOOD IN STOOL: 0
ABDOMINAL PAIN: 1
VOMITING: 1

## 2020-01-16 ASSESSMENT — PAIN DESCRIPTION - PAIN TYPE: TYPE: ACUTE PAIN

## 2020-01-16 ASSESSMENT — PAIN DESCRIPTION - DESCRIPTORS: DESCRIPTORS: ACHING;NAGGING

## 2020-01-16 ASSESSMENT — PAIN DESCRIPTION - ORIENTATION: ORIENTATION: RIGHT;POSTERIOR

## 2020-01-16 ASSESSMENT — PAIN DESCRIPTION - FREQUENCY: FREQUENCY: CONTINUOUS

## 2020-01-16 NOTE — OP NOTE
FACILITY: [address] [place] [address]  1980  [id number]    DATE:  01/16/20  SURGEON:  Dr. Rosalee Sanders M.D.  ASSISTANT:  Dr. Rosalee Sanders M.D. PREOPERATIVE DIAGNOSIS: Right Kidney stone   POSTOPERATIVE DIAGNOSIS: Same  PROCEDURES PERFORMED:  1. Cystoscopy. 2. Right sided stent placement. DRAINS: A right sided 5x26 cm double-J ureteral stent  SPECIMEN: post stent urine for culture  ANESTHESIA: General  ESTIMATED BLOOD LOSS:  None.   COMPLICATIONS:  None.     INDICATIONS FOR PROCEDURE:  Micael Blizzard is a 44 y.o. male presents for right kidney stone. After the risks, benefits, alternatives, of the procedure were discussed with the patient, informed consent was obtained. The patient elected to proceed.     DETAILS OF THE PROCEDURE:  The patient was brought back to the preoperative holding area to the operating suite, and was transferred to the operating table where the patient lay in supine position. General endotracheal anesthesia was induced, and patient was prepped and draped in standard surgical fashion after being placed in dorsolithotomy position. A proper timeout was performed, preoperative antibiotics were given. A rigid cystoscope with a 22 Kazakh sheath with 30 degree lens was inserted in the patient's urethral and into the bladder with ease. We then focused our attention on the right ureteral orifice, which we cannulated with our glidewire. Over our glidewire we placed a 5x26 cm double-J ureteral stent and we noted appropriate placement in the upper collecting system using fluoroscopy. We then removed our wire. There was good proximal and distal curl. We did not leave the string at the end of the stent. We then drained the patient's bladder and removed the scope and the procedure was subsequently terminated. Plan:   The patient will be discharged home in good condition. The patient will need to follow up for definitive stone management.

## 2020-01-16 NOTE — ANESTHESIA POSTPROCEDURE EVALUATION
Department of Anesthesiology  Postprocedure Note    Patient: Calvin Vargas  MRN: [id number]  [place]: 1980  Date of evaluation: 1/16/2020  Time:  9:21 AM     Procedure Summary     Date:  01/16/20 Room / Location:  [address] [place] [address] N / [address] [facility]    Anesthesia Start:  9971 Anesthesia Stop:  Evertt Ignacia    Procedure:  CYSTOSCOPY URETERAL STENT INSERTION (Right ) Diagnosis:  (kidney stones)    Surgeon:  Frederic Frost MD Responsible Provider:  Indira Lama MD    Anesthesia Type:  general, MAC ASA Status:  2          Anesthesia Type: general, MAC    Kaia Phase I: Kaia Score: 8    Kaia Phase II:      Last vitals: Reviewed and per EMR flowsheets.        Anesthesia Post Evaluation    Patient location during evaluation: PACU  Patient participation: complete - patient participated  Level of consciousness: awake and alert  Pain score: 0  Airway patency: patent  Nausea & Vomiting: no nausea and no vomiting  Complications: no  Cardiovascular status: blood pressure returned to baseline  Respiratory status: room air  Hydration status: euvolemic

## 2020-01-16 NOTE — PLAN OF CARE
Patient continues having right flank pain. Patient given scheduled Toradol for pain, Morphine and Norco. Bed in low position, brakes are on bed, call light within reach. Patient given Zofran and Phenergan for nausea.  Patient is NPO for a urethral stent this am.
(!) 155/106   Pulse 104   Temp 97.7 °F (36.5 °C) (Oral)   Resp 18   Ht 6' 3\" (1.905 m)   Wt 267 lb (121.1 kg)   SpO2 95%   BMI 33.37 kg/m²     LABS:   Hematology:  Recent Labs     01/15/20  0600 01/16/20  0605   WBC 10.2 11.0   HGB 15.3 13.6   HCT 45.0 40.3*    231     Chemistry:  Recent Labs     01/15/20  0600 01/16/20  0605    139   K 3.8 4.0   CL 99 104   CO2 23 24   GLUCOSE 189* 112*   BUN 11 7   CREATININE 1.01 1.03   CALCIUM 8.8 8.2*     Recent Labs     01/15/20  0600   PROT 7.2   LABALBU 4.0   AST 26   ALT 41   ALKPHOS 103   BILITOT 0.30   BILIDIR 0.13   LIPASE 15       PROBLEMS:  Principal Problem:    Hydroureteronephrosis  Active Problems:    Essential hypertension    Kidney stone  Resolved Problems:    * No resolved hospital problems. *      UPDATED PLAN:  See current orders  Diet as tolerated  Activity as tolerated  The patient's status and plan have been discussed with the patient and family at the bedside   Will discharge when arrangements complete and ok with other services.   F/U with Dr Uday Ames as scheduled   Follow-up with PCP in one week, Dea Lanes, APRN - CNP  Notify PCP of discharge     IP CONSULT TO [address], DO  1/16/2020  10:47 AM

## 2020-01-16 NOTE — FLOWSHEET NOTE
Urology consult called to Dr. Delfino Hylton, on call urologist. Notified of labs, CT results, pain and vomiting. Orders received.  Patient will be NPO for a possible stent placement in the AM.

## 2020-01-16 NOTE — H&P
and shortness of breath. Cardiovascular: Positive for palpitations and leg swelling (at the end of the day). Negative for chest pain. Gastrointestinal: Positive for abdominal pain (rt flank radiating to RLQ), nausea and vomiting. Negative for blood in stool and diarrhea. Endocrine: Negative. No hx diabetes, glucose levels normal over the last 18 months   Genitourinary: Positive for flank pain. Negative for difficulty urinating, dysuria and hematuria. Musculoskeletal: Positive for back pain (Rt flank). Neurological: Negative for dizziness, weakness and light-headedness. Hematological: Negative. All other systems reviewed and are negative. Physical Exam:   BP (!) 177/117   Pulse 106   Temp 99.5 °F (37.5 °C) (Temporal)   Resp 16   Ht 6' 3\" (1.905 m)   Wt 267 lb (121.1 kg)   SpO2 95%   BMI 33.37 kg/m²   Temp (24hrs), Av.2 °F (36.8 °C), Min:97.3 °F (36.3 °C), Max:99.5 °F (37.5 °C)    No results for input(s): POCGLU in the last 72 hours. Intake/Output Summary (Last 24 hours) at 2020 0747  Last data filed at 2020 0610  Gross per 24 hour   Intake 1725 ml   Output 875 ml   Net 850 ml       Physical Exam  Vitals signs and nursing note reviewed. Exam conducted with a chaperone present. Constitutional:       General: He is not in acute distress. Appearance: He is normal weight. HENT:      Head: Normocephalic. Nose: No congestion. Mouth/Throat:      Mouth: Mucous membranes are dry. Pharynx: Oropharynx is clear. No oropharyngeal exudate. Eyes:      General: No scleral icterus. Pupils: Pupils are equal, round, and reactive to light. Neck:      Musculoskeletal: Normal range of motion. Cardiovascular:      Rate and Rhythm: Normal rate and regular rhythm. Pulses: Normal pulses. Heart sounds: Normal heart sounds. Pulmonary:      Effort: Pulmonary effort is normal.      Breath sounds: Normal breath sounds.    Abdominal:      General: Abdomen is flat. Bowel sounds are normal.      Palpations: Abdomen is soft. Tenderness: There is tenderness (rt flank area). Musculoskeletal:         General: No swelling or tenderness. Lymphadenopathy:      Cervical: No cervical adenopathy. Skin:     General: Skin is warm and dry. Capillary Refill: Capillary refill takes less than 2 seconds. Neurological:      General: No focal deficit present. Mental Status: He is alert and oriented to person, place, and time. Cranial Nerves: No cranial nerve deficit. Psychiatric:         Mood and Affect: Mood normal.         Investigations:      Laboratory Testing:  Recent Results (from the past 24 hour(s))   Basic Metabolic Panel w/ Reflex to MG    Collection Time: 01/16/20  6:05 AM   Result Value Ref Range    Glucose 112 (H) 70 - 99 mg/dL    BUN 7 6 - 20 mg/dL    CREATININE 1.03 0.70 - 1.20 mg/dL    Bun/Cre Ratio NOT REPORTED 9 - 20    Calcium 8.2 (L) 8.6 - 10.4 mg/dL    Sodium 139 135 - 144 mmol/L    Potassium 4.0 3.7 - 5.3 mmol/L    Chloride 104 98 - 107 mmol/L    CO2 24 20 - 31 mmol/L    Anion Gap 11 9 - 17 mmol/L    GFR Non-African American >60 >60 mL/min    GFR African American >60 >60 mL/min    GFR Comment          GFR Staging NOT REPORTED    CBC    Collection Time: 01/16/20  6:05 AM   Result Value Ref Range    WBC 11.0 3.5 - 11.0 k/uL    RBC 4.27 (L) 4.5 - 5.9 m/uL    Hemoglobin 13.6 13.5 - 17.5 g/dL    Hematocrit 40.3 (L) 41 - 53 %    MCV 94.4 80 - 100 fL    MCH 31.8 26 - 34 pg    MCHC 33.7 31 - 37 g/dL    RDW 13.5 12.5 - 15.4 %    Platelets 892 622 - 972 k/uL    MPV 7.1 6.0 - 12.0 fL    NRBC Automated NOT REPORTED per 100 WBC       Imaging/Diagnostics:    Ct Abdomen Pelvis Wo Contrast  Result Date: 1/15/2020  Moderate right hydroureteronephrosis to the level of a 4 mm calculus in the mid right ureter.        Assessment :      Hospital Problems           Last Modified POA    * (Principal) Hydroureteronephrosis 1/16/2020 Yes    Essential hypertension 1/16/2020 Yes    Nephrolithiasis 1/16/2020 Yes          Plan:     Patient status inpatient in the Med/Surge    1. Give Losartan now  2. Continue Cipro IV  3. Follow up on urine culture results sent Adrián 15  4. Protonix 40 mg IV QD until nausea subsides  5. Pt and family updated and agreeable    Consultations:   IP CONSULT TO UROLOGY    Patient is admitted as inpatient status because of co-morbidities listed above, severity of signs and symptoms as outlined, requirement for current medical therapies and most importantly because of direct risk to patient if care not provided in a hospital setting.     PATRICIA Ledezma - CNS  1/16/2020  7:47 AM    Copy sent to Dr. Albert Amaya, APRN - CNP

## 2020-01-16 NOTE — CARE COORDINATION
CM unable to complete initial DC planning assessment as patient off unit in OR. CM to follow up after returning to unit.

## 2020-01-16 NOTE — FLOWSHEET NOTE
Patient awaken after his fluid bolus. Notified of available Phenergan if needs. Patient states he feels better. He states his pain is now a 3 and does not want pain or nausea medicine at this time. Patient was able to urinate 250 ml and states it was easier to urinate. No other complaints voiced.

## 2020-01-16 NOTE — FLOWSHEET NOTE
Patient up to the bathroom frequently to attempt to urinate. Patient only able to void a small amount. Patients heartrate reached 156 while attempting to urinate. Patient encouraged not to strain due to increased heart rate. Patient bladder scanned after urinating. Bladder scan read 0. Patient educated on how much urine the kidneys produce and how much he can expect to void each time.

## 2020-01-16 NOTE — ANESTHESIA PRE PROCEDURE
Department of Anesthesiology  Preprocedure Note       Name:  Anoop Delgado   Age:  44 y.o.  :  1980                                          MRN:  [id number]         Date:  2020      Surgeon: Jeanette Cash):  Rommel Liz MD    Procedure: CYSTOSCOPY URETERAL STENT INSERTION (Right )    Medications prior to admission:   Prior to Admission medications    Medication Sig Start Date End Date Taking? Authorizing Provider   ibuprofen (ADVIL;MOTRIN) 800 MG tablet Take 1 tablet by mouth every 8 hours as needed for Pain 1/15/20  Yes Naomy Bullock, DO   HYDROcodone-acetaminophen (NORCO) 5-325 MG per tablet Take 1 tablet by mouth every 8 hours as needed for Pain for up to 7 days.  1/15/20 1/22/20 Yes Naomy Bare, DO   ondansetron (ZOFRAN ODT) 4 MG disintegrating tablet Take 1 tablet by mouth every 8 hours as needed for Nausea 1/15/20  Yes Naomy Bullock, DO   omeprazole (PRILOSEC) 40 MG delayed release capsule take 1 capsule by mouth once daily 10/29/19  Yes PATRICIA Encarnacion - NP   losartan (COZAAR) 25 MG tablet Take 25 mg by mouth daily   Yes Historical Provider, MD   buPROPion (WELLBUTRIN XL) 300 MG extended release tablet Take 1 tablet by mouth every morning 18  Yes Soo Gomez MD   Cholecalciferol (VITAMIN D3) 37709 units TABS One tab weekly 18  Yes Soo Gomez MD   [facility]) 0.4 MG capsule Take 1 capsule by mouth daily for 5 doses 1/15/20 1/20/20  Naomy Bullock, DO   RA ALLERGY RELIEF 10 MG tablet 1 tablet daily 18   Historical Provider, MD       Current medications:    Current Facility-Administered Medications   Medication Dose Route Frequency Provider Last Rate Last Dose    iohexol (OMNIPAQUE 180) 180 MG/ML injection             lidocaine (XYLOCAINE) 2 % uro-jet             losartan (COZAAR) tablet 25 mg  25 mg Oral Daily Sharyle Pimple, APRN - CNS        pantoprazole (PROTONIX) injection 40 mg  40 mg Intravenous Daily Sharyle Pimple, APRN - CNS by Valerie Griffith MD at [facility] 35 N/A 7/19/2019    EGD BIOPSY performed by Valerie Griffith MD at [address] History:    Social History     Tobacco Use    Smoking status: Never Smoker    Smokeless tobacco: Former User     Types: Chew   Substance Use Topics    Alcohol use: Yes     Alcohol/week: 2.0 standard drinks     Types: 2 Shots of liquor per week     Comment: twice a week                                Counseling given: Not Answered      Vital Signs (Current):   Vitals:    01/15/20 2110 01/15/20 2320 01/16/20 0422 01/16/20 0736   BP: (!) 167/106 (!) 155/93 (!) 148/106 (!) 177/117   Pulse: 99 102 99 106   Resp: 18 16 16    Temp: 97.3 °F (36.3 °C) 98.2 °F (36.8 °C) 97.6 °F (36.4 °C) 99.5 °F (37.5 °C)   TempSrc: Oral Oral Oral Temporal   SpO2:   93% 95%   Weight: 267 lb (121.1 kg)      Height: 6' 3\" (1.905 m)                                                 BP Readings from Last 3 Encounters:   01/16/20 (!) 177/117   01/15/20 (!) 138/95   09/07/19 (!) 145/90       NPO Status: Time of last liquid consumption: 0100                        Time of last solid consumption: 2200                        Date of last liquid consumption: 01/16/20                        Date of last solid food consumption: 01/15/20    BMI:   Wt Readings from Last 3 Encounters:   01/15/20 267 lb (121.1 kg)   09/07/19 250 lb (113.4 kg)   08/28/19 251 lb 1.6 oz (113.9 kg)     Body mass index is 33.37 kg/m².     CBC:   Lab Results   Component Value Date    WBC 11.0 01/16/2020    RBC 4.27 01/16/2020    HGB 13.6 01/16/2020    HCT 40.3 01/16/2020    MCV 94.4 01/16/2020    RDW 13.5 01/16/2020     01/16/2020       CMP:   Lab Results   Component Value Date     01/16/2020    K 4.0 01/16/2020     01/16/2020    CO2 24 01/16/2020    BUN 7 01/16/2020    CREATININE 1.03 01/16/2020    GFRAA >60 01/16/2020    LABGLOM >60 01/16/2020    GLUCOSE 112 01/16/2020    PROT 7.2 01/15/2020 CALCIUM 8.2 01/16/2020    BILITOT 0.30 01/15/2020    ALKPHOS 103 01/15/2020    AST 26 01/15/2020    ALT 41 01/15/2020       POC Tests: No results for input(s): POCGLU, POCNA, POCK, POCCL, POCBUN, POCHEMO, POCHCT in the last 72 hours. Coags: No results found for: PROTIME, INR, APTT    HCG (If Applicable): No results found for: PREGTESTUR, PREGSERUM, HCG, HCGQUANT     ABGs: No results found for: PHART, PO2ART, TTQ3CDC, VRW7HZQ, BEART, Z8BNQNEO     Type & Screen (If Applicable):  No results found for: [facility]    Anesthesia Evaluation  Patient summary reviewed and Nursing notes reviewed no history of anesthetic complications:   Airway: Mallampati: II  TM distance: >3 FB   Neck ROM: full  Mouth opening: > = 3 FB Dental: normal exam         Pulmonary:Negative Pulmonary ROS and normal exam  breath sounds clear to auscultation                             Cardiovascular:    (+) hypertension: no interval change,         Rhythm: regular  Rate: normal                    Neuro/Psych:   (+) depression/anxiety             GI/Hepatic/Renal:   (+) renal disease: kidney stones,           Endo/Other: Negative Endo/Other ROS                    Abdominal:           Vascular: negative vascular ROS. Anesthesia Plan      general and MAC     ASA 2       Induction: intravenous. MIPS: Postoperative opioids intended and Prophylactic antiemetics administered. Anesthetic plan and risks discussed with patient.                       Diana Aguayo MD   1/16/2020

## 2020-01-16 NOTE — CONSULTS
normal.  Lymphatics: No palpable lymphadenopathy. Bladder non-tender and not distended.   Pelvic exam: deferred  Rectal exam not indicated    LABS:   Recent Labs     01/15/20  0600 01/16/20  0605   WBC 10.2 11.0   HGB 15.3 13.6   HCT 45.0 40.3*   MCV 94.2 94.4    231     Recent Labs     01/15/20  0600 01/16/20  0605    139   K 3.8 4.0   CL 99 104   CO2 23 24   BUN 11 7   CREATININE 1.01 1.03       Additional Lab/culture results:    Urinalysis:   Recent Labs     01/15/20  0719   COLORU YELLOW   PHUR 5.5   WBCUA 2 TO 5   RBCUA TOO NUMEROUS TO COUNT   MUCUS 1+*   TRICHOMONAS NOT REPORTED   YEAST NOT REPORTED   BACTERIA FEW*   SPECGRAV 1.020   LEUKOCYTESUR TRACE*   UROBILINOGEN Normal   BILIRUBINUR NEGATIVE        -----------------------------------------------------------------  Imaging Results:      Assessment and Plan   Impression:    Patient Active Problem List   Diagnosis    Anxiety    Shoulder pain, left    Rectal bleeding    Neck pain    Loose stools    Testicular pain, left    Essential hypertension    Eosinophilic esophagitis    Pharyngoesophageal dysphagia    Elevated ALT measurement    Tiredness    Hydroureteronephrosis    Kidney stone       Plan:   Cystoscopy right stent placement    Cash Moreau  8:36 AM 1/16/2020

## 2020-01-16 NOTE — DISCHARGE SUMMARY
of maximum stress 34.9 mCi sestamibi administered and routine SPECT imaging performed. Distribution of radiotracer within myocardium unremarkable. No convincing evidence of fixed or reversible perfusion defect. Left ventricular ejection fraction of 68%, within normal limits. No wall motion abnormalities are evident. IMPRESSION:  *  No fixed or reversible myocardial perfusion defects are evident. *  LVEF 68%. *  Low risk for significant cardiovascular event  by imaging criteria. *  EKG interpreted separately.     Pretest electrocardiogram showed normal sinus rhythm, RSR pattern in the   V1. Heart rate 78 beats per minute, blood pressure 148/105 mm of mercury. Regular Juan A protocol was used. Patient exercised for 9 min. Maximum heart rate achieved was 184 beats per minute and blood pressure   186/102 mm of mercury. Exercise was stopped because of fatigue and target heart rate achieved. No chest pain. During exercise and during recovery time no ischemic ECG changes noted, no   cardiac arrhythmias noted. Total Mets achieved 10.3.       Significant therapeutic interventions: IV fluids, pain management, cystoscopy with right ureteral stent placement by Dr Veronica Coronado    Significant Diagnostic Studies:   Bryant Abraham / Micro:  Recent Results (from the past 168 hour(s))   CBC    Collection Time: 01/15/20  6:00 AM   Result Value Ref Range    WBC 10.2 3.5 - 11.0 k/uL    RBC 4.77 4.5 - 5.9 m/uL    Hemoglobin 15.3 13.5 - 17.5 g/dL    Hematocrit 45.0 41 - 53 %    MCV 94.2 80 - 100 fL    MCH 32.1 26 - 34 pg    MCHC 34.1 31 - 37 g/dL    RDW 13.9 12.5 - 15.4 %    Platelets 498 971 - 080 k/uL    MPV 7.7 6.0 - 12.0 fL    NRBC Automated NOT REPORTED per 100 WBC   Basic Metabolic Panel    Collection Time: 01/15/20  6:00 AM   Result Value Ref Range    Glucose 189 (H) 70 - 99 mg/dL    BUN 11 6 - 20 mg/dL    CREATININE 1.01 0.70 - 1.20 mg/dL    Bun/Cre Ratio NOT REPORTED 9 - 20    Calcium 8.8 8.6 - 10.4 mg/dL    Sodium 137 [place] [address]  [phone]    Schedule an appointment as soon as possible for a visit in 2 weeks         Requiring Further Evaluation/Follow Up POST HOSPITALIZATION/Incidental Findings: Urine culture done January 15 pending at time of discharge    Diet: regular diet    Activity: As tolerated    Instructions to Patient: Follow-up with your healthcare providers    Discharge Medications:      Medication List      START taking these medications    oxybutynin 5 MG extended release tablet  Commonly known as:  DITROPAN XL  Take 2 tablets by mouth daily  Notes to patient:  ONLY take ONE TAB     oxyCODONE-acetaminophen 5-325 MG per tablet  Commonly known as:  PERCOCET  Take 1 tablet by mouth every 6 hours as needed for Pain for up to 5 days. Notes to patient:  ONLY take Percocet for SEVERE pain        CONTINUE taking these medications    buPROPion 300 MG extended release tablet  Commonly known as:  WELLBUTRIN XL  Take 1 tablet by mouth every morning     HYDROcodone-acetaminophen 5-325 MG per tablet  Commonly known as:  NORCO  Take 1 tablet by mouth every 8 hours as needed for Pain for up to 7 days.   Notes to patient:  TAKE FOR MILD TO MODERATE PAIN     ibuprofen 800 MG tablet  Commonly known as:  ADVIL;MOTRIN  Take 1 tablet by mouth every 8 hours as needed for Pain     losartan 25 MG tablet  Commonly known as:  COZAAR     omeprazole 40 MG delayed release capsule  Commonly known as:  PRILOSEC  take 1 capsule by mouth once daily     ondansetron 4 MG disintegrating tablet  Commonly known as:  ZOFRAN ODT  Take 1 tablet by mouth every 8 hours as needed for Nausea     RA ALLERGY RELIEF 10 MG tablet  Generic drug:  cetirizine     tamsulosin 0.4 MG capsule  Commonly known as:  FLOMAX  Take 1 capsule by mouth daily     Vitamin D3 1.25 MG (01201 UT) Tabs  One tab weekly           Where to Get Your Medications      You can get these medications from any pharmacy    Bring a paper prescription for each of these medications  · oxybutynin 5

## 2020-01-17 LAB
CULTURE: NORMAL
Lab: NORMAL
SPECIMEN DESCRIPTION: NORMAL

## 2020-01-24 ENCOUNTER — ANESTHESIA (OUTPATIENT)
Dept: OPERATING ROOM | Age: 40
End: 2020-01-24
Payer: COMMERCIAL

## 2020-01-24 ENCOUNTER — ANESTHESIA EVENT (OUTPATIENT)
Dept: OPERATING ROOM | Age: 40
End: 2020-01-24
Payer: COMMERCIAL

## 2020-01-24 ENCOUNTER — HOSPITAL ENCOUNTER (OUTPATIENT)
Age: 40
Setting detail: OUTPATIENT SURGERY
Discharge: HOME | End: 2020-01-24
Attending: UROLOGY | Admitting: UROLOGY
Payer: COMMERCIAL

## 2020-01-24 ENCOUNTER — APPOINTMENT (OUTPATIENT)
Dept: GENERAL RADIOLOGY | Age: 40
End: 2020-01-24
Attending: UROLOGY
Payer: COMMERCIAL

## 2020-01-24 VITALS
BODY MASS INDEX: 31.69 KG/M2 | RESPIRATION RATE: 17 BRPM | HEIGHT: 75 IN | OXYGEN SATURATION: 100 % | TEMPERATURE: 96.8 F | SYSTOLIC BLOOD PRESSURE: 151 MMHG | WEIGHT: 254.85 LBS | DIASTOLIC BLOOD PRESSURE: 97 MMHG | HEART RATE: 108 BPM

## 2020-01-24 VITALS — SYSTOLIC BLOOD PRESSURE: 100 MMHG | TEMPERATURE: 96.1 F | DIASTOLIC BLOOD PRESSURE: 60 MMHG | OXYGEN SATURATION: 95 %

## 2020-01-24 PROCEDURE — 2720000010 HC SURG SUPPLY STERILE: Performed by: UROLOGY

## 2020-01-24 PROCEDURE — 7100000011 HC PHASE II RECOVERY - ADDTL 15 MIN: Performed by: UROLOGY

## 2020-01-24 PROCEDURE — 2709999900 HC NON-CHARGEABLE SUPPLY: Performed by: UROLOGY

## 2020-01-24 PROCEDURE — 6370000000 HC RX 637 (ALT 250 FOR IP): Performed by: ANESTHESIOLOGY

## 2020-01-24 PROCEDURE — 3700000001 HC ADD 15 MINUTES (ANESTHESIA): Performed by: UROLOGY

## 2020-01-24 PROCEDURE — C2617 STENT, NON-COR, TEM W/O DEL: HCPCS | Performed by: UROLOGY

## 2020-01-24 PROCEDURE — 2580000003 HC RX 258: Performed by: UROLOGY

## 2020-01-24 PROCEDURE — 7100000000 HC PACU RECOVERY - FIRST 15 MIN: Performed by: UROLOGY

## 2020-01-24 PROCEDURE — 74018 RADEX ABDOMEN 1 VIEW: CPT

## 2020-01-24 PROCEDURE — 3700000000 HC ANESTHESIA ATTENDED CARE: Performed by: UROLOGY

## 2020-01-24 PROCEDURE — 6360000002 HC RX W HCPCS: Performed by: NURSE ANESTHETIST, CERTIFIED REGISTERED

## 2020-01-24 PROCEDURE — 82365 CALCULUS SPECTROSCOPY: CPT

## 2020-01-24 PROCEDURE — 6360000002 HC RX W HCPCS: Performed by: PHYSICIAN ASSISTANT

## 2020-01-24 PROCEDURE — 2580000003 HC RX 258: Performed by: ANESTHESIOLOGY

## 2020-01-24 PROCEDURE — 3600000004 HC SURGERY LEVEL 4 BASE: Performed by: UROLOGY

## 2020-01-24 PROCEDURE — 6360000002 HC RX W HCPCS: Performed by: ANESTHESIOLOGY

## 2020-01-24 PROCEDURE — 7100000001 HC PACU RECOVERY - ADDTL 15 MIN: Performed by: UROLOGY

## 2020-01-24 PROCEDURE — 3600000014 HC SURGERY LEVEL 4 ADDTL 15MIN: Performed by: UROLOGY

## 2020-01-24 PROCEDURE — 2500000003 HC RX 250 WO HCPCS: Performed by: NURSE ANESTHETIST, CERTIFIED REGISTERED

## 2020-01-24 PROCEDURE — C1769 GUIDE WIRE: HCPCS | Performed by: UROLOGY

## 2020-01-24 PROCEDURE — 7100000010 HC PHASE II RECOVERY - FIRST 15 MIN: Performed by: UROLOGY

## 2020-01-24 PROCEDURE — C1758 CATHETER, URETERAL: HCPCS | Performed by: UROLOGY

## 2020-01-24 DEVICE — UNIVERSA SOFT URETERAL STENT AND POSITIONER WITH HYDROPHILIC COATING AND MONOFILAMENT TETHER
Type: IMPLANTABLE DEVICE | Site: URETER | Status: FUNCTIONAL
Brand: UNIVERSA

## 2020-01-24 RX ORDER — PHENYLEPHRINE HYDROCHLORIDE 10 MG/ML
INJECTION INTRAVENOUS PRN
Status: DISCONTINUED | OUTPATIENT
Start: 2020-01-24 | End: 2020-01-24 | Stop reason: SDUPTHER

## 2020-01-24 RX ORDER — ONDANSETRON 2 MG/ML
4 INJECTION INTRAMUSCULAR; INTRAVENOUS
Status: DISCONTINUED | OUTPATIENT
Start: 2020-01-24 | End: 2020-01-24 | Stop reason: HOSPADM

## 2020-01-24 RX ORDER — MIDAZOLAM HYDROCHLORIDE 1 MG/ML
INJECTION INTRAMUSCULAR; INTRAVENOUS PRN
Status: DISCONTINUED | OUTPATIENT
Start: 2020-01-24 | End: 2020-01-24 | Stop reason: SDUPTHER

## 2020-01-24 RX ORDER — FENTANYL CITRATE 50 UG/ML
25 INJECTION, SOLUTION INTRAMUSCULAR; INTRAVENOUS EVERY 5 MIN PRN
Status: DISCONTINUED | OUTPATIENT
Start: 2020-01-24 | End: 2020-01-24 | Stop reason: HOSPADM

## 2020-01-24 RX ORDER — HYDROCODONE BITARTRATE AND ACETAMINOPHEN 5; 325 MG/1; MG/1
1 TABLET ORAL EVERY 6 HOURS PRN
Qty: 12 TABLET | Refills: 0 | Status: SHIPPED | OUTPATIENT
Start: 2020-01-24 | End: 2020-01-24 | Stop reason: HOSPADM

## 2020-01-24 RX ORDER — OXYCODONE HYDROCHLORIDE AND ACETAMINOPHEN 5; 325 MG/1; MG/1
1 TABLET ORAL
Status: COMPLETED | OUTPATIENT
Start: 2020-01-24 | End: 2020-01-24

## 2020-01-24 RX ORDER — BUDESONIDE 3 MG/1
6 CAPSULE, COATED PELLETS ORAL EVERY MORNING
Qty: 180 CAPSULE | Refills: 1 | Status: SHIPPED | OUTPATIENT
Start: 2020-01-24 | End: 2020-08-31

## 2020-01-24 RX ORDER — MAGNESIUM HYDROXIDE 1200 MG/15ML
LIQUID ORAL PRN
Status: DISCONTINUED | OUTPATIENT
Start: 2020-01-24 | End: 2020-01-24 | Stop reason: ALTCHOICE

## 2020-01-24 RX ORDER — DOCUSATE SODIUM 100 MG/1
100 CAPSULE, LIQUID FILLED ORAL 2 TIMES DAILY
Qty: 60 CAPSULE | Refills: 0 | Status: SHIPPED | OUTPATIENT
Start: 2020-01-24 | End: 2020-02-23

## 2020-01-24 RX ORDER — MAGNESIUM HYDROXIDE 1200 MG/15ML
LIQUID ORAL CONTINUOUS PRN
Status: COMPLETED | OUTPATIENT
Start: 2020-01-24 | End: 2020-01-24

## 2020-01-24 RX ORDER — FENTANYL CITRATE 50 UG/ML
50 INJECTION, SOLUTION INTRAMUSCULAR; INTRAVENOUS EVERY 5 MIN PRN
Status: DISCONTINUED | OUTPATIENT
Start: 2020-01-24 | End: 2020-01-24 | Stop reason: HOSPADM

## 2020-01-24 RX ORDER — ONDANSETRON 2 MG/ML
4 INJECTION INTRAMUSCULAR; INTRAVENOUS ONCE
Status: DISCONTINUED | OUTPATIENT
Start: 2020-01-24 | End: 2020-01-24 | Stop reason: HOSPADM

## 2020-01-24 RX ORDER — MIDAZOLAM HYDROCHLORIDE 1 MG/ML
2 INJECTION INTRAMUSCULAR; INTRAVENOUS
Status: DISCONTINUED | OUTPATIENT
Start: 2020-01-24 | End: 2020-01-24 | Stop reason: HOSPADM

## 2020-01-24 RX ORDER — SODIUM CHLORIDE 0.9 % (FLUSH) 0.9 %
10 SYRINGE (ML) INJECTION EVERY 12 HOURS SCHEDULED
Status: DISCONTINUED | OUTPATIENT
Start: 2020-01-24 | End: 2020-01-24 | Stop reason: HOSPADM

## 2020-01-24 RX ORDER — GLYCOPYRROLATE 1 MG/5 ML
SYRINGE (ML) INTRAVENOUS PRN
Status: DISCONTINUED | OUTPATIENT
Start: 2020-01-24 | End: 2020-01-24 | Stop reason: SDUPTHER

## 2020-01-24 RX ORDER — LIDOCAINE HYDROCHLORIDE 10 MG/ML
INJECTION, SOLUTION EPIDURAL; INFILTRATION; INTRACAUDAL; PERINEURAL PRN
Status: DISCONTINUED | OUTPATIENT
Start: 2020-01-24 | End: 2020-01-24 | Stop reason: SDUPTHER

## 2020-01-24 RX ORDER — FENTANYL CITRATE 50 UG/ML
INJECTION, SOLUTION INTRAMUSCULAR; INTRAVENOUS PRN
Status: DISCONTINUED | OUTPATIENT
Start: 2020-01-24 | End: 2020-01-24 | Stop reason: SDUPTHER

## 2020-01-24 RX ORDER — SODIUM CHLORIDE, SODIUM LACTATE, POTASSIUM CHLORIDE, CALCIUM CHLORIDE 600; 310; 30; 20 MG/100ML; MG/100ML; MG/100ML; MG/100ML
INJECTION, SOLUTION INTRAVENOUS CONTINUOUS
Status: DISCONTINUED | OUTPATIENT
Start: 2020-01-24 | End: 2020-01-24 | Stop reason: HOSPADM

## 2020-01-24 RX ORDER — DEXAMETHASONE SODIUM PHOSPHATE 10 MG/ML
INJECTION INTRAMUSCULAR; INTRAVENOUS PRN
Status: DISCONTINUED | OUTPATIENT
Start: 2020-01-24 | End: 2020-01-24 | Stop reason: SDUPTHER

## 2020-01-24 RX ORDER — SODIUM CHLORIDE 0.9 % (FLUSH) 0.9 %
10 SYRINGE (ML) INJECTION PRN
Status: DISCONTINUED | OUTPATIENT
Start: 2020-01-24 | End: 2020-01-24 | Stop reason: HOSPADM

## 2020-01-24 RX ORDER — TAMSULOSIN HYDROCHLORIDE 0.4 MG/1
0.4 CAPSULE ORAL DAILY
Qty: 30 CAPSULE | Refills: 0 | Status: ON HOLD | OUTPATIENT
Start: 2020-01-24 | End: 2020-06-09 | Stop reason: ALTCHOICE

## 2020-01-24 RX ORDER — FENTANYL CITRATE 50 UG/ML
25 INJECTION, SOLUTION INTRAMUSCULAR; INTRAVENOUS ONCE
Status: DISCONTINUED | OUTPATIENT
Start: 2020-01-24 | End: 2020-01-24 | Stop reason: HOSPADM

## 2020-01-24 RX ORDER — PROPOFOL 10 MG/ML
INJECTION, EMULSION INTRAVENOUS PRN
Status: DISCONTINUED | OUTPATIENT
Start: 2020-01-24 | End: 2020-01-24 | Stop reason: SDUPTHER

## 2020-01-24 RX ORDER — OXYCODONE HYDROCHLORIDE AND ACETAMINOPHEN 5; 325 MG/1; MG/1
1 TABLET ORAL EVERY 6 HOURS PRN
Qty: 8 TABLET | Refills: 0 | Status: SHIPPED | OUTPATIENT
Start: 2020-01-24 | End: 2020-01-27

## 2020-01-24 RX ORDER — ONDANSETRON 2 MG/ML
INJECTION INTRAMUSCULAR; INTRAVENOUS PRN
Status: DISCONTINUED | OUTPATIENT
Start: 2020-01-24 | End: 2020-01-24 | Stop reason: SDUPTHER

## 2020-01-24 RX ORDER — MEPERIDINE HYDROCHLORIDE 50 MG/ML
12.5 INJECTION INTRAMUSCULAR; INTRAVENOUS; SUBCUTANEOUS EVERY 5 MIN PRN
Status: DISCONTINUED | OUTPATIENT
Start: 2020-01-24 | End: 2020-01-24 | Stop reason: HOSPADM

## 2020-01-24 RX ADMIN — FENTANYL CITRATE 25 MCG: 50 INJECTION INTRAMUSCULAR; INTRAVENOUS at 15:56

## 2020-01-24 RX ADMIN — DEXAMETHASONE SODIUM PHOSPHATE 10 MG: 10 INJECTION INTRAMUSCULAR; INTRAVENOUS at 15:24

## 2020-01-24 RX ADMIN — ONDANSETRON 4 MG: 2 INJECTION, SOLUTION INTRAMUSCULAR; INTRAVENOUS at 15:52

## 2020-01-24 RX ADMIN — OXYCODONE HYDROCHLORIDE AND ACETAMINOPHEN 1 TABLET: 5; 325 TABLET ORAL at 17:06

## 2020-01-24 RX ADMIN — PROPOFOL 200 MG: 10 INJECTION, EMULSION INTRAVENOUS at 15:20

## 2020-01-24 RX ADMIN — FENTANYL CITRATE 25 MCG: 50 INJECTION INTRAMUSCULAR; INTRAVENOUS at 15:18

## 2020-01-24 RX ADMIN — Medication 0.2 MG: at 15:44

## 2020-01-24 RX ADMIN — Medication 0.2 MG: at 15:42

## 2020-01-24 RX ADMIN — PHENYLEPHRINE HYDROCHLORIDE 100 MCG: 10 INJECTION INTRAVENOUS at 15:49

## 2020-01-24 RX ADMIN — PHENYLEPHRINE HYDROCHLORIDE 100 MCG: 10 INJECTION INTRAVENOUS at 15:53

## 2020-01-24 RX ADMIN — FENTANYL CITRATE 25 MCG: 50 INJECTION, SOLUTION INTRAMUSCULAR; INTRAVENOUS at 16:53

## 2020-01-24 RX ADMIN — FENTANYL CITRATE 50 MCG: 50 INJECTION, SOLUTION INTRAMUSCULAR; INTRAVENOUS at 16:44

## 2020-01-24 RX ADMIN — SODIUM CHLORIDE, POTASSIUM CHLORIDE, SODIUM LACTATE AND CALCIUM CHLORIDE: 600; 310; 30; 20 INJECTION, SOLUTION INTRAVENOUS at 13:48

## 2020-01-24 RX ADMIN — LIDOCAINE HYDROCHLORIDE 50 MG: 10 INJECTION, SOLUTION EPIDURAL; INFILTRATION; INTRACAUDAL; PERINEURAL at 15:18

## 2020-01-24 RX ADMIN — PROPOFOL 200 MG: 10 INJECTION, EMULSION INTRAVENOUS at 15:18

## 2020-01-24 RX ADMIN — FENTANYL CITRATE 25 MCG: 50 INJECTION, SOLUTION INTRAMUSCULAR; INTRAVENOUS at 16:39

## 2020-01-24 RX ADMIN — MIDAZOLAM HYDROCHLORIDE 2 MG: 1 INJECTION, SOLUTION INTRAMUSCULAR; INTRAVENOUS at 15:18

## 2020-01-24 RX ADMIN — CEFAZOLIN 2 G: 10 INJECTION, POWDER, FOR SOLUTION INTRAVENOUS at 15:25

## 2020-01-24 RX ADMIN — FENTANYL CITRATE 50 MCG: 50 INJECTION INTRAMUSCULAR; INTRAVENOUS at 15:32

## 2020-01-24 ASSESSMENT — PULMONARY FUNCTION TESTS
PIF_VALUE: 18
PIF_VALUE: 19
PIF_VALUE: 18
PIF_VALUE: 13
PIF_VALUE: 13
PIF_VALUE: 1
PIF_VALUE: 14
PIF_VALUE: 15
PIF_VALUE: 10
PIF_VALUE: 18
PIF_VALUE: 1
PIF_VALUE: 12
PIF_VALUE: 14
PIF_VALUE: 11
PIF_VALUE: 14
PIF_VALUE: 18
PIF_VALUE: 19
PIF_VALUE: 18
PIF_VALUE: 14
PIF_VALUE: 18
PIF_VALUE: 0
PIF_VALUE: 18
PIF_VALUE: 12
PIF_VALUE: 12
PIF_VALUE: 18
PIF_VALUE: 12
PIF_VALUE: 19
PIF_VALUE: 1
PIF_VALUE: 21
PIF_VALUE: 13
PIF_VALUE: 14
PIF_VALUE: 28
PIF_VALUE: 1
PIF_VALUE: 18
PIF_VALUE: 14
PIF_VALUE: 12
PIF_VALUE: 19
PIF_VALUE: 12
PIF_VALUE: 1
PIF_VALUE: 12
PIF_VALUE: 17
PIF_VALUE: 4
PIF_VALUE: 18
PIF_VALUE: 18
PIF_VALUE: 20
PIF_VALUE: 1
PIF_VALUE: 14
PIF_VALUE: 1
PIF_VALUE: 12
PIF_VALUE: 18
PIF_VALUE: 7
PIF_VALUE: 2
PIF_VALUE: 12

## 2020-01-24 ASSESSMENT — PAIN SCALES - GENERAL
PAINLEVEL_OUTOF10: 7
PAINLEVEL_OUTOF10: 5
PAINLEVEL_OUTOF10: 6
PAINLEVEL_OUTOF10: 4

## 2020-01-24 ASSESSMENT — PAIN DESCRIPTION - LOCATION: LOCATION: GROIN

## 2020-01-24 ASSESSMENT — PAIN - FUNCTIONAL ASSESSMENT: PAIN_FUNCTIONAL_ASSESSMENT: 0-10

## 2020-01-24 NOTE — ANESTHESIA PRE PROCEDURE
Department of Anesthesiology  Preprocedure Note       Name:  Christie Jolley   Age:  [id number] [facility] y.o.  :  1980                                          MRN:  [id number]         Date:  2020      Surgeon: Jim Griffin):  Chilango Costa MD    Procedure: HOLMIUM- CYSTO, URETEROSCOPY, STENT EXCHANGE (Right )    Medications prior to admission:   Prior to Admission medications    Medication Sig Start Date End Date Taking? Authorizing Provider   budesonide (ENTOCORT EC) 3 MG extended release capsule Take 2 capsules by mouth every morning 20  PATRICIA Rutledge NP   tamsulosin [facility]) 0.4 MG capsule Take 1 capsule by mouth daily 20   Chilango Costa MD   oxybutynin (DITROPAN XL) 5 MG extended release tablet Take 2 tablets by mouth daily 20   Chilango Costa MD   ibuprofen (ADVIL;MOTRIN) 800 MG tablet Take 1 tablet by mouth every 8 hours as needed for Pain 1/15/20   Samule Redhead, DO   ondansetron (ZOFRAN ODT) 4 MG disintegrating tablet Take 1 tablet by mouth every 8 hours as needed for Nausea 1/15/20   Samule Redhead, DO   omeprazole (PRILOSEC) 40 MG delayed release capsule take 1 capsule by mouth once daily 10/29/19   PATRICIA Rutledge NP   losartan (COZAAR) 25 MG tablet Take 25 mg by mouth daily    Historical Provider, MD   buPROPion (WELLBUTRIN XL) 300 MG extended release tablet Take 1 tablet by mouth every morning 18   Vani Fountain MD   Cholecalciferol (VITAMIN D3) 59632 units TABS One tab weekly 18   Vani Fountain MD   RA ALLERGY RELIEF 10 MG tablet 1 tablet daily 18   Historical Provider, MD       Current medications:    Current Outpatient Medications   Medication Sig Dispense Refill    budesonide (ENTOCORT EC) 3 MG extended release capsule Take 2 capsules by mouth every morning 180 capsule 1     No current facility-administered medications for this visit. Allergies:     Allergies   Allergen Reactions    Naproxen Nausea Only

## 2020-01-24 NOTE — H&P
Patient seen and evaluated, H&P in chart and updated, patient ok to proceed to OR. Govind Fontaine  1:17 PM  1/24/2020                 Jacob Pablo, Melissa Day, Gilberto Lindquist, Trisha Adams. Urology Consultation     Patient:  Jassi Albright  MRN: [id number]  YOB: 1980     CHIEF COMPLAINT:  Right kidney stone     HISTORY OF PRESENT ILLNESS:   The patient is a 44 y.o. male who presents with Right flank pain  Found to have a 4 mm mid ureteral kidney stone with hydronephrosis  Has history of kidney stones in the past  Afebrile, non toxic  Denies hematuria  Pain 10/10   history: kidney stone   FH: none     Patient's old records, notes and chart reviewed and summarized above.     Past Medical History:    Past Medical History        Past Medical History:   Diagnosis Date    History of kidney stones      Hypertension              Past Surgical History:    Past Surgical History   Past Surgical History:   Procedure Laterality Date    COLONOSCOPY   12/08/2017     No lesions seen up to the cecum and a couple of inches of ileum. Random biopsies taken. , pathology--LARGE INTESTINAL TYPE MUCOSA DEMONSTRATING NO SIGNIFICANT     HERNIA REPAIR Left 07/27/2016     Dr Petty Mcnair N/A 12/8/2017     COLONOSCOPY WITH BIOPSY performed by Magy Silva MD at [address] EGD TRANSORAL BIOPSY SINGLE/MULTIPLE N/A 12/8/2017     EGD BIOPSY performed by Magy Silva MD at [facility]   05/26/2009     pathology-focal active esophagitis, showing prominent eosinophilia-up to 50 esonophils per high power field    UPPER GASTROINTESTINAL ENDOSCOPY   12/08/2017     Probable eosinophilic esophagitis no stricture seen, pathology-SQUAMOUS EPITHELIUM WITH EOSINOPHILIA (SEE COMMENT)     UPPER GASTROINTESTINAL ENDOSCOPY N/A 7/6/2018     EGD BIOPSY performed by Magy Silva MD at [address] [place] [address] 94 % 6' 3\" (1.905 m) 250 lb (113.4 kg)      Constitutional: Patient in no acute distress. Neuro: Alert and oriented to person, place and time. Psych: mood and affect normal  HEENT negative  Lungs: Respiratory effort is normal  Cardiovascular: Normal peripheral pulses  Abdomen: Soft, non-tender, non-distended with no CVA, flank pain or hepatosplenomegaly. No hernias. Kidneys normal.  Lymphatics: No palpable lymphadenopathy. Bladder non-tender and not distended.   Pelvic exam: deferred  Rectal exam not indicated     LABS:        Recent Labs     01/15/20  0600 01/16/20  0605   WBC 10.2 11.0   HGB 15.3 13.6   HCT 45.0 40.3*   MCV 94.2 94.4    231           Recent Labs     01/15/20  0600 01/16/20  0605    139   K 3.8 4.0   CL 99 104   CO2 23 24   BUN 11 7   CREATININE 1.01 1.03         Additional Lab/culture results:     Urinalysis:       Recent Labs     01/15/20  0719   COLORU YELLOW   PHUR 5.5   WBCUA 2 TO 5   RBCUA TOO NUMEROUS TO COUNT   MUCUS 1+*   TRICHOMONAS NOT REPORTED   YEAST NOT REPORTED   BACTERIA FEW*   SPECGRAV 1.020   LEUKOCYTESUR TRACE*   UROBILINOGEN Normal   BILIRUBINUR NEGATIVE         -----------------------------------------------------------------  Imaging Results:        Assessment and Plan   Impression:        Patient Active Problem List   Diagnosis    Anxiety    Shoulder pain, left    Rectal bleeding    Neck pain    Loose stools    Testicular pain, left    Essential hypertension    Eosinophilic esophagitis    Pharyngoesophageal dysphagia    Elevated ALT measurement    Tiredness    Hydroureteronephrosis    Kidney stone         Plan:   Cystoscopy right stent placement     Cashfabrizio Gómez Mercury  8:36 AM 1/16/2020

## 2020-01-28 LAB
STONE COMPOSITION: NORMAL
STONE DESCRIPTION: NORMAL
STONE MASS: 19 MG
STONE NUMBER: 4
STONE SIZE: NORMAL MM

## 2020-05-26 ENCOUNTER — TELEPHONE (OUTPATIENT)
Dept: GASTROENTEROLOGY | Age: 40
End: 2020-05-26

## 2020-05-26 ENCOUNTER — HOSPITAL ENCOUNTER (OUTPATIENT)
Dept: CT IMAGING | Age: 40
Discharge: HOME | End: 2020-05-28
Payer: COMMERCIAL

## 2020-05-26 PROCEDURE — 6360000004 HC RX CONTRAST MEDICATION: Performed by: SURGERY

## 2020-05-26 PROCEDURE — 74177 CT ABD & PELVIS W/CONTRAST: CPT

## 2020-05-26 PROCEDURE — 2580000003 HC RX 258: Performed by: SURGERY

## 2020-05-26 RX ORDER — SODIUM CHLORIDE 0.9 % (FLUSH) 0.9 %
10 SYRINGE (ML) INJECTION PRN
Status: DISCONTINUED | OUTPATIENT
Start: 2020-05-26 | End: 2020-05-29 | Stop reason: HOSPADM

## 2020-05-26 RX ORDER — 0.9 % SODIUM CHLORIDE 0.9 %
80 INTRAVENOUS SOLUTION INTRAVENOUS ONCE
Status: COMPLETED | OUTPATIENT
Start: 2020-05-26 | End: 2020-05-26

## 2020-05-26 RX ADMIN — IOHEXOL 50 ML: 240 INJECTION, SOLUTION INTRATHECAL; INTRAVASCULAR; INTRAVENOUS; ORAL at 09:04

## 2020-05-26 RX ADMIN — SODIUM CHLORIDE 80 ML: 9 INJECTION, SOLUTION INTRAVENOUS at 09:04

## 2020-05-26 RX ADMIN — Medication 10 ML: at 09:05

## 2020-05-26 RX ADMIN — IOVERSOL 75 ML: 741 INJECTION INTRA-ARTERIAL; INTRAVENOUS at 09:04

## 2020-06-06 ENCOUNTER — HOSPITAL ENCOUNTER (OUTPATIENT)
Dept: PREADMISSION TESTING | Age: 40
Setting detail: SPECIMEN
Discharge: HOME | End: 2020-06-10
Payer: COMMERCIAL

## 2020-06-06 LAB
SARS-COV-2, PCR: NORMAL
SARS-COV-2, RAPID: NORMAL
SARS-COV-2: NOT DETECTED
SOURCE: NORMAL

## 2020-06-06 PROCEDURE — U0003 INFECTIOUS AGENT DETECTION BY NUCLEIC ACID (DNA OR RNA); SEVERE ACUTE RESPIRATORY SYNDROME CORONAVIRUS 2 (SARS-COV-2) (CORONAVIRUS DISEASE [COVID-19]), AMPLIFIED PROBE TECHNIQUE, MAKING USE OF HIGH THROUGHPUT TECHNOLOGIES AS DESCRIBED BY CMS-2020-01-R: HCPCS

## 2020-06-07 ENCOUNTER — TELEPHONE (OUTPATIENT)
Dept: PRIMARY CARE CLINIC | Age: 40
End: 2020-06-07

## 2020-06-09 ENCOUNTER — ANESTHESIA EVENT (OUTPATIENT)
Dept: OPERATING ROOM | Age: 40
End: 2020-06-09
Payer: COMMERCIAL

## 2020-06-09 ENCOUNTER — HOSPITAL ENCOUNTER (OUTPATIENT)
Age: 40
Setting detail: OUTPATIENT SURGERY
Discharge: HOME | End: 2020-06-09
Attending: SURGERY | Admitting: SURGERY
Payer: COMMERCIAL

## 2020-06-09 ENCOUNTER — ANESTHESIA (OUTPATIENT)
Dept: OPERATING ROOM | Age: 40
End: 2020-06-09
Payer: COMMERCIAL

## 2020-06-09 VITALS — TEMPERATURE: 98.2 F | DIASTOLIC BLOOD PRESSURE: 87 MMHG | SYSTOLIC BLOOD PRESSURE: 141 MMHG | OXYGEN SATURATION: 97 %

## 2020-06-09 VITALS
OXYGEN SATURATION: 95 % | HEART RATE: 115 BPM | DIASTOLIC BLOOD PRESSURE: 102 MMHG | TEMPERATURE: 97.8 F | SYSTOLIC BLOOD PRESSURE: 171 MMHG | HEIGHT: 75 IN | WEIGHT: 265 LBS | RESPIRATION RATE: 18 BRPM | BODY MASS INDEX: 32.95 KG/M2

## 2020-06-09 PROCEDURE — 7100000001 HC PACU RECOVERY - ADDTL 15 MIN: Performed by: SURGERY

## 2020-06-09 PROCEDURE — 99999 PR OFFICE/OUTPT VISIT,PROCEDURE ONLY: CPT | Performed by: PHYSICIAN ASSISTANT

## 2020-06-09 PROCEDURE — 6360000002 HC RX W HCPCS: Performed by: ANESTHESIOLOGY

## 2020-06-09 PROCEDURE — 2500000003 HC RX 250 WO HCPCS: Performed by: NURSE ANESTHETIST, CERTIFIED REGISTERED

## 2020-06-09 PROCEDURE — 3700000000 HC ANESTHESIA ATTENDED CARE: Performed by: SURGERY

## 2020-06-09 PROCEDURE — S2900 ROBOTIC SURGICAL SYSTEM: HCPCS | Performed by: SURGERY

## 2020-06-09 PROCEDURE — C1781 MESH (IMPLANTABLE): HCPCS | Performed by: SURGERY

## 2020-06-09 PROCEDURE — 3600000019 HC SURGERY ROBOT ADDTL 15MIN: Performed by: SURGERY

## 2020-06-09 PROCEDURE — 7100000000 HC PACU RECOVERY - FIRST 15 MIN: Performed by: SURGERY

## 2020-06-09 PROCEDURE — 6360000002 HC RX W HCPCS: Performed by: NURSE ANESTHETIST, CERTIFIED REGISTERED

## 2020-06-09 PROCEDURE — 6370000000 HC RX 637 (ALT 250 FOR IP): Performed by: ANESTHESIOLOGY

## 2020-06-09 PROCEDURE — 2580000003 HC RX 258: Performed by: ANESTHESIOLOGY

## 2020-06-09 PROCEDURE — 2500000003 HC RX 250 WO HCPCS: Performed by: SURGERY

## 2020-06-09 PROCEDURE — 6360000002 HC RX W HCPCS: Performed by: SURGERY

## 2020-06-09 PROCEDURE — 2500000003 HC RX 250 WO HCPCS: Performed by: ANESTHESIOLOGY

## 2020-06-09 PROCEDURE — 3700000001 HC ADD 15 MINUTES (ANESTHESIA): Performed by: SURGERY

## 2020-06-09 PROCEDURE — 2709999900 HC NON-CHARGEABLE SUPPLY: Performed by: SURGERY

## 2020-06-09 PROCEDURE — 3600000009 HC SURGERY ROBOT BASE: Performed by: SURGERY

## 2020-06-09 DEVICE — PATCH HERN M DIA2.5IN CIR W/ STRP SEPRA TECHNOLOGY ABSRB: Type: IMPLANTABLE DEVICE | Site: ABDOMEN | Status: FUNCTIONAL

## 2020-06-09 RX ORDER — MORPHINE SULFATE 2 MG/ML
2 INJECTION, SOLUTION INTRAMUSCULAR; INTRAVENOUS EVERY 5 MIN PRN
Status: DISCONTINUED | OUTPATIENT
Start: 2020-06-09 | End: 2020-06-09 | Stop reason: HOSPADM

## 2020-06-09 RX ORDER — HYDROCODONE BITARTRATE AND ACETAMINOPHEN 5; 325 MG/1; MG/1
1 TABLET ORAL PRN
Status: COMPLETED | OUTPATIENT
Start: 2020-06-09 | End: 2020-06-09

## 2020-06-09 RX ORDER — CEPHALEXIN 500 MG/1
CAPSULE ORAL
Qty: 21 CAPSULE | Refills: 0 | Status: ON HOLD | OUTPATIENT
Start: 2020-06-09 | End: 2020-09-11 | Stop reason: ALTCHOICE

## 2020-06-09 RX ORDER — HYDRALAZINE HYDROCHLORIDE 20 MG/ML
5 INJECTION INTRAMUSCULAR; INTRAVENOUS EVERY 10 MIN PRN
Status: DISCONTINUED | OUTPATIENT
Start: 2020-06-09 | End: 2020-06-09 | Stop reason: HOSPADM

## 2020-06-09 RX ORDER — KETOROLAC TROMETHAMINE 30 MG/ML
30 INJECTION, SOLUTION INTRAMUSCULAR; INTRAVENOUS
Status: COMPLETED | OUTPATIENT
Start: 2020-06-09 | End: 2020-06-09

## 2020-06-09 RX ORDER — SODIUM CHLORIDE, SODIUM LACTATE, POTASSIUM CHLORIDE, CALCIUM CHLORIDE 600; 310; 30; 20 MG/100ML; MG/100ML; MG/100ML; MG/100ML
INJECTION, SOLUTION INTRAVENOUS CONTINUOUS
Status: DISCONTINUED | OUTPATIENT
Start: 2020-06-09 | End: 2020-06-09 | Stop reason: HOSPADM

## 2020-06-09 RX ORDER — HYDROMORPHONE HCL 110MG/55ML
PATIENT CONTROLLED ANALGESIA SYRINGE INTRAVENOUS PRN
Status: DISCONTINUED | OUTPATIENT
Start: 2020-06-09 | End: 2020-06-09 | Stop reason: SDUPTHER

## 2020-06-09 RX ORDER — ONDANSETRON 2 MG/ML
INJECTION INTRAMUSCULAR; INTRAVENOUS PRN
Status: DISCONTINUED | OUTPATIENT
Start: 2020-06-09 | End: 2020-06-09 | Stop reason: SDUPTHER

## 2020-06-09 RX ORDER — METOCLOPRAMIDE HYDROCHLORIDE 5 MG/ML
10 INJECTION INTRAMUSCULAR; INTRAVENOUS
Status: COMPLETED | OUTPATIENT
Start: 2020-06-09 | End: 2020-06-09

## 2020-06-09 RX ORDER — LIDOCAINE HYDROCHLORIDE 10 MG/ML
INJECTION, SOLUTION EPIDURAL; INFILTRATION; INTRACAUDAL; PERINEURAL PRN
Status: DISCONTINUED | OUTPATIENT
Start: 2020-06-09 | End: 2020-06-09 | Stop reason: SDUPTHER

## 2020-06-09 RX ORDER — ONDANSETRON 4 MG/1
TABLET, FILM COATED ORAL
Qty: 20 TABLET | Refills: 0 | Status: ON HOLD | OUTPATIENT
Start: 2020-06-09 | End: 2020-09-11 | Stop reason: ALTCHOICE

## 2020-06-09 RX ORDER — EPHEDRINE SULFATE 50 MG/ML
INJECTION, SOLUTION INTRAVENOUS PRN
Status: DISCONTINUED | OUTPATIENT
Start: 2020-06-09 | End: 2020-06-09 | Stop reason: SDUPTHER

## 2020-06-09 RX ORDER — BUPIVACAINE HYDROCHLORIDE 5 MG/ML
INJECTION, SOLUTION EPIDURAL; INTRACAUDAL PRN
Status: DISCONTINUED | OUTPATIENT
Start: 2020-06-09 | End: 2020-06-09 | Stop reason: ALTCHOICE

## 2020-06-09 RX ORDER — PROPOFOL 10 MG/ML
INJECTION, EMULSION INTRAVENOUS PRN
Status: DISCONTINUED | OUTPATIENT
Start: 2020-06-09 | End: 2020-06-09 | Stop reason: SDUPTHER

## 2020-06-09 RX ORDER — FENTANYL CITRATE 50 UG/ML
INJECTION, SOLUTION INTRAMUSCULAR; INTRAVENOUS PRN
Status: DISCONTINUED | OUTPATIENT
Start: 2020-06-09 | End: 2020-06-09 | Stop reason: SDUPTHER

## 2020-06-09 RX ORDER — ROCURONIUM BROMIDE 10 MG/ML
INJECTION, SOLUTION INTRAVENOUS PRN
Status: DISCONTINUED | OUTPATIENT
Start: 2020-06-09 | End: 2020-06-09 | Stop reason: SDUPTHER

## 2020-06-09 RX ORDER — HYDROCODONE BITARTRATE AND ACETAMINOPHEN 5; 325 MG/1; MG/1
2 TABLET ORAL PRN
Status: COMPLETED | OUTPATIENT
Start: 2020-06-09 | End: 2020-06-09

## 2020-06-09 RX ORDER — MEPERIDINE HYDROCHLORIDE 25 MG/ML
12.5 INJECTION INTRAMUSCULAR; INTRAVENOUS; SUBCUTANEOUS EVERY 5 MIN PRN
Status: DISCONTINUED | OUTPATIENT
Start: 2020-06-09 | End: 2020-06-09 | Stop reason: HOSPADM

## 2020-06-09 RX ORDER — DEXAMETHASONE SODIUM PHOSPHATE 4 MG/ML
INJECTION, SOLUTION INTRA-ARTICULAR; INTRALESIONAL; INTRAMUSCULAR; INTRAVENOUS; SOFT TISSUE PRN
Status: DISCONTINUED | OUTPATIENT
Start: 2020-06-09 | End: 2020-06-09 | Stop reason: SDUPTHER

## 2020-06-09 RX ORDER — DIPHENHYDRAMINE HYDROCHLORIDE 50 MG/ML
12.5 INJECTION INTRAMUSCULAR; INTRAVENOUS
Status: DISCONTINUED | OUTPATIENT
Start: 2020-06-09 | End: 2020-06-09 | Stop reason: HOSPADM

## 2020-06-09 RX ORDER — OXYCODONE HYDROCHLORIDE AND ACETAMINOPHEN 5; 325 MG/1; MG/1
1 TABLET ORAL EVERY 6 HOURS PRN
Qty: 28 TABLET | Refills: 0 | Status: SHIPPED | OUTPATIENT
Start: 2020-06-09 | End: 2020-06-16

## 2020-06-09 RX ORDER — FENTANYL CITRATE 50 UG/ML
50 INJECTION, SOLUTION INTRAMUSCULAR; INTRAVENOUS EVERY 5 MIN PRN
Status: DISCONTINUED | OUTPATIENT
Start: 2020-06-09 | End: 2020-06-09 | Stop reason: HOSPADM

## 2020-06-09 RX ORDER — LABETALOL 20 MG/4 ML (5 MG/ML) INTRAVENOUS SYRINGE
5 EVERY 10 MIN PRN
Status: DISCONTINUED | OUTPATIENT
Start: 2020-06-09 | End: 2020-06-09 | Stop reason: HOSPADM

## 2020-06-09 RX ORDER — ONDANSETRON 2 MG/ML
4 INJECTION INTRAMUSCULAR; INTRAVENOUS
Status: COMPLETED | OUTPATIENT
Start: 2020-06-09 | End: 2020-06-09

## 2020-06-09 RX ORDER — FENTANYL CITRATE 50 UG/ML
25 INJECTION, SOLUTION INTRAMUSCULAR; INTRAVENOUS EVERY 5 MIN PRN
Status: DISCONTINUED | OUTPATIENT
Start: 2020-06-09 | End: 2020-06-09 | Stop reason: HOSPADM

## 2020-06-09 RX ADMIN — SODIUM CHLORIDE, POTASSIUM CHLORIDE, SODIUM LACTATE AND CALCIUM CHLORIDE: 600; 310; 30; 20 INJECTION, SOLUTION INTRAVENOUS at 17:35

## 2020-06-09 RX ADMIN — ONDANSETRON 4 MG: 2 INJECTION INTRAMUSCULAR; INTRAVENOUS at 18:31

## 2020-06-09 RX ADMIN — FENTANYL CITRATE 100 MCG: 50 INJECTION, SOLUTION INTRAMUSCULAR; INTRAVENOUS at 16:02

## 2020-06-09 RX ADMIN — EPHEDRINE SULFATE 10 MG: 50 INJECTION INTRAMUSCULAR; INTRAVENOUS; SUBCUTANEOUS at 16:45

## 2020-06-09 RX ADMIN — FENTANYL CITRATE 50 MCG: 50 INJECTION, SOLUTION INTRAMUSCULAR; INTRAVENOUS at 17:33

## 2020-06-09 RX ADMIN — SODIUM CHLORIDE, POTASSIUM CHLORIDE, SODIUM LACTATE AND CALCIUM CHLORIDE: 600; 310; 30; 20 INJECTION, SOLUTION INTRAVENOUS at 11:37

## 2020-06-09 RX ADMIN — ONDANSETRON 4 MG: 2 INJECTION INTRAMUSCULAR; INTRAVENOUS at 17:25

## 2020-06-09 RX ADMIN — HYDROMORPHONE HYDROCHLORIDE 0.25 MG: 1 INJECTION, SOLUTION INTRAMUSCULAR; INTRAVENOUS; SUBCUTANEOUS at 18:34

## 2020-06-09 RX ADMIN — HYDROMORPHONE HYDROCHLORIDE 0.25 MG: 1 INJECTION, SOLUTION INTRAMUSCULAR; INTRAVENOUS; SUBCUTANEOUS at 19:31

## 2020-06-09 RX ADMIN — LIDOCAINE HYDROCHLORIDE 50 MG: 10 INJECTION, SOLUTION EPIDURAL; INFILTRATION; INTRACAUDAL; PERINEURAL at 16:02

## 2020-06-09 RX ADMIN — DEXAMETHASONE SODIUM PHOSPHATE 8 MG: 4 INJECTION, SOLUTION INTRA-ARTICULAR; INTRALESIONAL; INTRAMUSCULAR; INTRAVENOUS; SOFT TISSUE at 16:14

## 2020-06-09 RX ADMIN — PROPOFOL 200 MG: 10 INJECTION, EMULSION INTRAVENOUS at 16:02

## 2020-06-09 RX ADMIN — ROCURONIUM BROMIDE 62 MG: 10 INJECTION, SOLUTION INTRAVENOUS at 16:03

## 2020-06-09 RX ADMIN — HYDROMORPHONE HYDROCHLORIDE 0.25 MG: 1 INJECTION, SOLUTION INTRAMUSCULAR; INTRAVENOUS; SUBCUTANEOUS at 18:53

## 2020-06-09 RX ADMIN — SODIUM CHLORIDE, POTASSIUM CHLORIDE, SODIUM LACTATE AND CALCIUM CHLORIDE: 600; 310; 30; 20 INJECTION, SOLUTION INTRAVENOUS at 16:15

## 2020-06-09 RX ADMIN — ROCURONIUM BROMIDE 8 MG: 10 INJECTION, SOLUTION INTRAVENOUS at 16:02

## 2020-06-09 RX ADMIN — HYDROMORPHONE HYDROCHLORIDE 0.25 MG: 1 INJECTION, SOLUTION INTRAMUSCULAR; INTRAVENOUS; SUBCUTANEOUS at 19:17

## 2020-06-09 RX ADMIN — ROCURONIUM BROMIDE 20 MG: 10 INJECTION, SOLUTION INTRAVENOUS at 16:59

## 2020-06-09 RX ADMIN — KETOROLAC TROMETHAMINE 30 MG: 30 INJECTION, SOLUTION INTRAMUSCULAR; INTRAVENOUS at 17:52

## 2020-06-09 RX ADMIN — HYDROMORPHONE HYDROCHLORIDE 1 MG: 2 INJECTION, SOLUTION INTRAMUSCULAR; INTRAVENOUS; SUBCUTANEOUS at 17:13

## 2020-06-09 RX ADMIN — HYDROMORPHONE HYDROCHLORIDE 1 MG: 2 INJECTION, SOLUTION INTRAMUSCULAR; INTRAVENOUS; SUBCUTANEOUS at 17:41

## 2020-06-09 RX ADMIN — SUGAMMADEX 250 MG: 100 INJECTION, SOLUTION INTRAVENOUS at 17:29

## 2020-06-09 RX ADMIN — METOCLOPRAMIDE 10 MG: 5 INJECTION, SOLUTION INTRAMUSCULAR; INTRAVENOUS at 18:31

## 2020-06-09 RX ADMIN — FENTANYL CITRATE 50 MCG: 50 INJECTION, SOLUTION INTRAMUSCULAR; INTRAVENOUS at 16:32

## 2020-06-09 RX ADMIN — HYDROCODONE BITARTRATE AND ACETAMINOPHEN 2 TABLET: 5; 325 TABLET ORAL at 19:37

## 2020-06-09 RX ADMIN — Medication 3 G: at 16:10

## 2020-06-09 ASSESSMENT — PULMONARY FUNCTION TESTS
PIF_VALUE: 29
PIF_VALUE: 14
PIF_VALUE: 22
PIF_VALUE: 1
PIF_VALUE: 29
PIF_VALUE: 0
PIF_VALUE: 20
PIF_VALUE: 20
PIF_VALUE: 22
PIF_VALUE: 29
PIF_VALUE: 28
PIF_VALUE: 27
PIF_VALUE: 29
PIF_VALUE: 22
PIF_VALUE: 28
PIF_VALUE: 7
PIF_VALUE: 28
PIF_VALUE: 28
PIF_VALUE: 29
PIF_VALUE: 27
PIF_VALUE: 1
PIF_VALUE: 28
PIF_VALUE: 29
PIF_VALUE: 28
PIF_VALUE: 23
PIF_VALUE: 27
PIF_VALUE: 28
PIF_VALUE: 21
PIF_VALUE: 22
PIF_VALUE: 22
PIF_VALUE: 30
PIF_VALUE: 28
PIF_VALUE: 29
PIF_VALUE: 28
PIF_VALUE: 2
PIF_VALUE: 22
PIF_VALUE: 22
PIF_VALUE: 21
PIF_VALUE: 34
PIF_VALUE: 22
PIF_VALUE: 29
PIF_VALUE: 27
PIF_VALUE: 29
PIF_VALUE: 29
PIF_VALUE: 28
PIF_VALUE: 22
PIF_VALUE: 28
PIF_VALUE: 22
PIF_VALUE: 28
PIF_VALUE: 0
PIF_VALUE: 28
PIF_VALUE: 1
PIF_VALUE: 29
PIF_VALUE: 28
PIF_VALUE: 19
PIF_VALUE: 1
PIF_VALUE: 20
PIF_VALUE: 28
PIF_VALUE: 21
PIF_VALUE: 28
PIF_VALUE: 28
PIF_VALUE: 29
PIF_VALUE: 0
PIF_VALUE: 20
PIF_VALUE: 20
PIF_VALUE: 22
PIF_VALUE: 28
PIF_VALUE: 28
PIF_VALUE: 21
PIF_VALUE: 4
PIF_VALUE: 21
PIF_VALUE: 22
PIF_VALUE: 21
PIF_VALUE: 20
PIF_VALUE: 29
PIF_VALUE: 22
PIF_VALUE: 19
PIF_VALUE: 28
PIF_VALUE: 23
PIF_VALUE: 23
PIF_VALUE: 28
PIF_VALUE: 20
PIF_VALUE: 22
PIF_VALUE: 29
PIF_VALUE: 22
PIF_VALUE: 1
PIF_VALUE: 21
PIF_VALUE: 23
PIF_VALUE: 22
PIF_VALUE: 29
PIF_VALUE: 2
PIF_VALUE: 24
PIF_VALUE: 22
PIF_VALUE: 22
PIF_VALUE: 28
PIF_VALUE: 21
PIF_VALUE: 1
PIF_VALUE: 29
PIF_VALUE: 22
PIF_VALUE: 22
PIF_VALUE: 21
PIF_VALUE: 2
PIF_VALUE: 28
PIF_VALUE: 28
PIF_VALUE: 2

## 2020-06-09 ASSESSMENT — PAIN SCALES - GENERAL
PAINLEVEL_OUTOF10: 7
PAINLEVEL_OUTOF10: 5
PAINLEVEL_OUTOF10: 4
PAINLEVEL_OUTOF10: 5

## 2020-06-09 ASSESSMENT — PAIN DESCRIPTION - LOCATION
LOCATION: ABDOMEN

## 2020-06-09 ASSESSMENT — ENCOUNTER SYMPTOMS: STRIDOR: 0

## 2020-06-09 ASSESSMENT — PAIN - FUNCTIONAL ASSESSMENT: PAIN_FUNCTIONAL_ASSESSMENT: 0-10

## 2020-06-09 ASSESSMENT — PAIN DESCRIPTION - DESCRIPTORS: DESCRIPTORS: SHARP

## 2020-06-09 NOTE — OP NOTE
Ethibond suture was retrieved from the abdominal aspect using the GraNee needle. Mesh was tented up against the abdominal wall. Mesh was secured to the anterior abdominal wall using running OV lock suture x2. Satisfactory tension-free repair was achieved. Hemostasis was confirmed. Needle and the fat were retrieved. Again hemostasis was confirmed. Sponge needle instrument counts found to be correct. At this point all the instruments were removed. Robot was undocked. Ports were removed. Skin was approximated in usual fashion using absorbable sutures. Local anesthetic was infiltrated. Dermabond was applied. Steri-Strips were applied. Sterile dressing was applied. Patient tolerated procedure well and was transferred to the recovery room in stable condition. Commendations: Operative findings were discussed with the patient's family at length. Postoperative course recovery restrictions follow-up were all discussed. Prescriptions were called in electronically.     Electronically signed by Era Escalona MD on 6/9/2020 at 5:25 PM

## 2020-06-09 NOTE — H&P
ENDOSCOPY  05/26/2009    pathology-focal active esophagitis, showing prominent eosinophilia-up to 50 esonophils per high power field    UPPER GASTROINTESTINAL ENDOSCOPY  12/08/2017    Probable eosinophilic esophagitis no stricture seen, pathology-SQUAMOUS EPITHELIUM WITH EOSINOPHILIA (SEE COMMENT)     UPPER GASTROINTESTINAL ENDOSCOPY N/A 7/6/2018    EGD BIOPSY performed by Garry Felix MD at [place] [address] N/A 7/19/2019    EGD BIOPSY performed by Garry Felix MD at [address] [place] [address]       Family History   Problem Relation Age of Onset    Cancer Mother     High Cholesterol Father     High Cholesterol Brother        SOCIAL HISTORY       Social History     Socioeconomic History    Marital status:      Spouse name: None    Number of children: 2    Years of education: None    Highest education level: None   Occupational History    None   Social Needs    Financial resource strain: None    Food insecurity     Worry: None     Inability: None    Transportation needs     Medical: None     Non-medical: None   Tobacco Use    Smoking status: Never Smoker    Smokeless tobacco: Former User     Types: Chew   Substance and Sexual Activity    Alcohol use:  Yes     Alcohol/week: 2.0 standard drinks     Types: 2 Shots of liquor per week     Comment: twice a week    Drug use: No    Sexual activity: None   Lifestyle    Physical activity     Days per week: None     Minutes per session: None    Stress: None   Relationships    Social connections     Talks on phone: None     Gets together: None     Attends [employer] service: None     Active member of club or organization: None     Attends meetings of clubs or organizations: None     Relationship status: None    Intimate partner violence     Fear of current or ex partner: None     Emotionally abused: None     Physically abused: None     Forced sexual activity: None   Other Topics Concern    None   Social central.  No palpable masses or L.N.                 CHEST:  Symmetrical and equal on expansion. HEART:  RRR S1 > S2. No audible murmurs or gallops. LUNGS:  Equal on expansion, normal breath sounds. No adventitious sounds. ABDOMEN:  Obese. Soft on palpation. No dysphagia, No localized tenderness. No guarding or rigidity. No palpable hepatosplenomegaly. LYMPHATICS:  No palpable cervical lymphadenopathy. LOCOMOTOR, BACK AND SPINE:  No tenderness or deformities. EXTREMITIES:  Symmetrical, no pretibial edema. Aminahs sign negative. No discoloration or ulcerations. NEUROLOGIC:  The patient is conscious, alert, oriented,Cranial nerve II-XII intact, taste and smell were not examined. No apparent focal sensory or motor deficits.              PROVISIONAL DIAGNOSES / SURGERY:      VENTRAL HERNIA    HERNIA VENTRAL REPAIR LAPAROSCOPIC ROBOTIC  [facility]    Patient Active Problem List    Diagnosis Date Noted    Ventral hernia without obstruction or gangrene     Kidney stone 01/16/2020    Hypocalcemia 01/16/2020    Labile blood pressure     Hydroureteronephrosis 01/15/2020    Tiredness 39/92/8467    Eosinophilic esophagitis 51/96/3626    Pharyngoesophageal dysphagia 02/08/2018    Elevated ALT measurement 02/08/2018    Essential hypertension 01/18/2018    Loose stools 11/06/2017    Testicular pain, left 11/06/2017    Neck pain 01/06/2017    Rectal bleeding 12/13/2016    Shoulder pain, left 06/30/2016    Anxiety 03/03/2014           KANA SHAFER, APRN - CNP on 7/29/2020 at 11:11 AM

## 2020-06-09 NOTE — H&P
HISTORY and Annmar[id number]       NAME:  Marisel Oneal  MRN: [id number]   YOB: 1980   Date: 6/9/2020   Age: 36 y.o. Gender: male       COMPLAINT AND PRESENT HISTORY:     Marisel Oneal is 36 y.o.,  male, Preadmission Testing for ventral hernia. Pt initially noticed hernia about 6 months ago. Reports hernia has become more tender in last few months. Rating pain 5/10. Takes advil with moderate relief. The hernia has not grown in size. The hernia is tender, reducible, expansile on coughing, no signs of bowl obstruction. Denies any abdominal pain, nausea, vomiting, diarrhea, constipation. No Hx of MRSA infections in the past. Previous hernia repair in 2016. PMHx HTN, Reports intermittent headache and dizziness but denies presently. Reports intermittent bilateral lower extremity mild edema. Pt reports missed several doses of antihypertensives recently. Encouraged to make appointment with PCP for evaluation. NPO. Took losartan this am with sip of water. No blood thinners in last 7 days. Denies any current chest pain/pressure, palpitations, SOB, recent URI, fever or chills. PAST MEDICAL HISTORY     Past Medical History:   Diagnosis Date    History of kidney stones     Hypertension        SURGICAL HISTORY       Past Surgical History:   Procedure Laterality Date    COLONOSCOPY  12/08/2017    No lesions seen up to the cecum and a couple of inches of ileum. Random biopsies taken. , pathology--LARGE INTESTINAL TYPE MUCOSA DEMONSTRATING NO SIGNIFICANT     CYSTO/URETERO/PYELOSCOPY, CALCULUS TX Right 1/24/2020    HOLMIUM LASER LITHO, CYSTO, RIGHT URETEROSCOPY, RIGHT STENT EXCHANGE, STONE BASKETING performed by Barney Snell MD at [address] Right 1/16/2020    CYSTOSCOPY URETERAL STENT INSERTION performed by Barney Snell MD at [address] [facility] Right 01/24/2020    HOLMIUM LASER LITHO, CYSTO, RIGHT URETEROSCOPY, RIGHT STENT None     Attends Christian service: None     Active member of club or organization: None     Attends meetings of clubs or organizations: None     Relationship status: None    Intimate partner violence     Fear of current or ex partner: None     Emotionally abused: None     Physically abused: None     Forced sexual activity: None   Other Topics Concern    None   Social History Narrative    None        REVIEW OF SYSTEMS      Allergies   Allergen Reactions    Chicken Allergy      Other reaction(s): Throat issue    Naproxen Nausea Only    Peanut-Containing Drug Products      Other reaction(s): Throat issue       No current facility-administered medications on file prior to encounter. Current Outpatient Medications on File Prior to Encounter   Medication Sig Dispense Refill    ibuprofen (ADVIL;MOTRIN) 800 MG tablet Take 1 tablet by mouth every 8 hours as needed for Pain 30 tablet 0    omeprazole (PRILOSEC) 40 MG delayed release capsule take 1 capsule by mouth once daily 30 capsule 3    buPROPion (WELLBUTRIN XL) 300 MG extended release tablet Take 1 tablet by mouth every morning 30 tablet 3    Cholecalciferol (VITAMIN D3) 01228 units TABS One tab weekly 4 tablet 5    RA ALLERGY RELIEF 10 MG tablet 1 tablet daily  0    ondansetron (ZOFRAN ODT) 4 MG disintegrating tablet Take 1 tablet by mouth every 8 hours as needed for Nausea 10 tablet 0    losartan (COZAAR) 25 MG tablet Take 25 mg by mouth daily       Negative except for what is mentioned in the HPI. GENERAL PHYSICAL EXAM     Vitals: BP (!) 150/107   Pulse 95   Temp 97.3 °F (36.3 °C) (Oral)   Resp 16   Ht 6' 3\" (1.905 m)   Wt 265 lb (120.2 kg)   SpO2 95%   BMI 33.12 kg/m²  Body mass index is 33.12 kg/m². GENERAL APPEARANCE:   Ana Maria Zamorano is 36 y.o.,  male, moderately obese, nourished, conscious, alert. Does not appear to be in any distress or pain at this time.                             SKIN:  Warm, dry, no cyanosis or jaundice. HEAD:  Normocephalic, atraumatic. EYES:  Pupils equal, reactive to light. EARS:  No discharge, no marked hearing loss. NOSE:  No rhinorrhea, epistaxis or septal deformity. THROAT:  Uvula midline, no erythema or exudate. NECK:  No stiffness, trachea central.  No palpable masses or L.N.                 CHEST:  Symmetrical and equal on expansion. HEART:  RRR Hypertensive. S1 > S2. No audible murmurs or gallops. LUNGS:  Equal on expansion, normal breath sounds. No wheezing, rhonchi or rales. ABDOMEN:  NABS x 4 quads. Small hernia at umbilicus. Soft on palpation. No localized tenderness. No guarding or rigidity. LYMPHATICS:  No palpable cervical lymphadenopathy. LOCOMOTOR, BACK AND SPINE:  No tenderness or deformities. EXTREMITIES:  Symmetrical, no pretibial edema. No calf tenderness. No discoloration or ulcerations. NEUROLOGIC:  The patient is conscious, alert, oriented. No apparent focal sensory or motor deficits.              PROVISIONAL DIAGNOSES / SURGERY:      VENTRAL HERNIA    HERNIA VENTRAL REPAIR LAPAROSCOPIC ROBOTIC  [facility] [address]    Patient Active Problem List    Diagnosis Date Noted    Kidney stone 01/16/2020    Hypocalcemia 01/16/2020    Labile blood pressure     Hydroureteronephrosis 01/15/2020    Tiredness 74/21/6835    Eosinophilic esophagitis 36/00/7196    Pharyngoesophageal dysphagia 02/08/2018    Elevated ALT measurement 02/08/2018    Essential hypertension 01/18/2018    Loose stools 11/06/2017    Testicular pain, left 11/06/2017    Neck pain 01/06/2017    Rectal bleeding 12/13/2016    Shoulder pain, left 06/30/2016    Anxiety 03/03/2014           PATRICIA Mason CNP on 6/9/2020 at 11:47 AM

## 2020-08-25 ENCOUNTER — OFFICE VISIT (OUTPATIENT)
Dept: GASTROENTEROLOGY | Age: 40
End: 2020-08-25
Payer: COMMERCIAL

## 2020-08-25 VITALS — HEIGHT: 75 IN | BODY MASS INDEX: 33.34 KG/M2 | TEMPERATURE: 97.2 F | WEIGHT: 268.1 LBS

## 2020-08-25 PROCEDURE — APPSS45 APP SPLIT SHARED TIME 31-45 MINUTES: Performed by: NURSE PRACTITIONER

## 2020-08-25 PROCEDURE — 99214 OFFICE O/P EST MOD 30 MIN: CPT | Performed by: INTERNAL MEDICINE

## 2020-08-25 ASSESSMENT — ENCOUNTER SYMPTOMS
COUGH: 1
SORE THROAT: 1
VOMITING: 0
EYES NEGATIVE: 1
BLOOD IN STOOL: 0
CONSTIPATION: 0
ABDOMINAL PAIN: 1
RECTAL PAIN: 0
TROUBLE SWALLOWING: 1
ALLERGIC/IMMUNOLOGIC NEGATIVE: 1
SINUS PRESSURE: 0
ANAL BLEEDING: 0
DIARRHEA: 0
ABDOMINAL DISTENTION: 0
NAUSEA: 0
VOICE CHANGE: 0
BACK PAIN: 0
WHEEZING: 0
CHOKING: 0

## 2020-08-25 NOTE — PROGRESS NOTES
GI OFFICE FOLLOW UP    INTERVAL HISTORY:   No referring provider defined for this encounter. Chief Complaint   Patient presents with    Follow-up     Patient is here today for a 1 year f/u       1. Eosinophilic esophagitis              HISTORY OF PRESENT ILLNESS: Papo Villanueva is a 36 y.o. male with a past history remarkable for eosinophilic esophagitis, referred for evaluation of   Chief Complaint   Patient presents with    Follow-up     Patient is here today for a 1 year f/u     Patient being seen for yearly follow-up for eosinophilic esophagitis. Patient was diagnosed to have eosinophilic esophagitis as early as 2009. Initially he was tried on fluticasone therapy and was not getting relief. EGD 1 year ago revealed close 200 eosinophils per high power field from the esophageal biopsy. In the last 1 year he has been on budesonide 6 mg a day. Still is having intermittent swallowing issues at least 2-3 times per week. No symptom of complete obstruction of esophagus. Also has GERD. He has been on PPI therapy as well. Also reports sore throat. Recently he has some weight gain. Bowel movements are satisfactory. No diarrhea, constipation. No melena, hematochezia. past Medical,Family, and Social History reviewed and does contribute to the patient presenting condition. Patient's PMH/PSH,SH,PSYCH Hx, MEDs, ALLERGIES, and ROS were all reviewed and updated in the appropriate sections. PAST MEDICAL HISTORY:  Past Medical History:   Diagnosis Date    History of kidney stones     Hypertension        Past Surgical History:   Procedure Laterality Date    COLONOSCOPY  12/08/2017    No lesions seen up to the cecum and a couple of inches of ileum. Random biopsies taken. , pathology--LARGE INTESTINAL TYPE MUCOSA DEMONSTRATING NO SIGNIFICANT     CYSTO/URETERO/PYELOSCOPY, CALCULUS TX Right 1/24/2020    HOLMIUM LASER LITHO, CYSTO, RIGHT URETEROSCOPY, RIGHT STENT EXCHANGE, STONE BASKETING performed by Silvina Awad MD at [address] Right 1/16/2020    CYSTOSCOPY URETERAL STENT INSERTION performed by Silvina Awad MD at [address] [facility] Right 01/24/2020    HOLMIUM LASER LITHO, CYSTO, RIGHT URETEROSCOPY, RIGHT STENT EXCHANGE, STONE BASKETING    HERNIA REPAIR Left 07/27/2016    Dr Leticia Arizmendi N/A 6/9/2020    HERNIA VENTRAL REPAIR LAPAROSCOPIC ROBOTIC XI W/MESH performed by Davion Paniagua MD at [facility] SINGLE/MULTIPLE N/A 12/8/2017    COLONOSCOPY WITH BIOPSY performed by Damion Medina MD at [address] [place] [address] EGD TRANSORAL BIOPSY SINGLE/MULTIPLE N/A 12/8/2017    EGD BIOPSY performed by Damion Medina MD at [facility]  05/26/2009    pathology-focal active esophagitis, showing prominent eosinophilia-up to 50 esonophils per high power field    UPPER GASTROINTESTINAL ENDOSCOPY  12/08/2017    Probable eosinophilic esophagitis no stricture seen, pathology-SQUAMOUS EPITHELIUM WITH EOSINOPHILIA (SEE COMMENT)     UPPER GASTROINTESTINAL ENDOSCOPY N/A 7/6/2018    EGD BIOPSY performed by Damion Medina MD at [address] [place] [address] 7/19/2019    EGD BIOPSY performed by Damion Medina MD at [address] [place] [address] St:    Current Outpatient Medications:     omeprazole (PRILOSEC) 40 MG delayed release capsule, take 1 capsule by mouth once daily, Disp: 30 capsule, Rfl: 3    losartan (COZAAR) 25 MG tablet, Take 25 mg by mouth daily, Disp: , Rfl:     buPROPion (WELLBUTRIN XL) 300 MG extended release tablet, Take 1 tablet by mouth every morning, Disp: 30 tablet, Rfl: 3    Cholecalciferol (VITAMIN D3) 51129 units TABS, One tab weekly, Disp: 4 tablet, Rfl: 5    RA ALLERGY RELIEF 10 MG tablet, 1 tablet daily, Disp: , Rfl: 0    cephALEXin (KEFLEX) 500 MG capsule, 500 mgTake three times daily (Patient not taking: Reported on 8/25/2020), Disp: 21 capsule, Rfl: 0    ondansetron (ZOFRAN) 4 MG tablet, Take every six hours as needed (Patient not taking: Reported on 8/25/2020), Disp: 20 tablet, Rfl: 0    ibuprofen (ADVIL;MOTRIN) 800 MG tablet, Take 1 tablet by mouth every 8 hours as needed for Pain (Patient not taking: Reported on 8/25/2020), Disp: 30 tablet, Rfl: 0    ondansetron (ZOFRAN ODT) 4 MG disintegrating tablet, Take 1 tablet by mouth every 8 hours as needed for Nausea (Patient not taking: Reported on 8/25/2020), Disp: 10 tablet, Rfl: 0    ALLERGIES:   Allergies   Allergen Reactions    Chicken Allergy      Other reaction(s): Throat issue    Naproxen Nausea Only    Peanut-Containing Drug Products      Other reaction(s): Throat issue       FAMILY HISTORY:       Problem Relation Age of Onset    Cancer Mother     High Cholesterol Father     High Cholesterol Brother          SOCIAL HISTORY:   Social History     Socioeconomic History    Marital status:      Spouse name: Not on file    Number of children: 2    Years of education: Not on file    Highest education level: Not on file   Occupational History    Not on file   Social Needs    Financial resource strain: Not on file    Food insecurity     Worry: Not on file     Inability: Not on file   [employer] needs     Medical: Not on file     Non-medical: Not on file   Tobacco Use    Smoking status: Never Smoker    Smokeless tobacco: Former User     Types: Chew   Substance and Sexual Activity    Alcohol use:  Yes     Alcohol/week: 2.0 standard drinks     Types: 2 Shots of liquor per week     Comment: twice a week    Drug use: No    Sexual activity: Not on file   Lifestyle    Physical activity     Days per week: Not on file     Minutes per session: Not on file    Stress: Not on file   Relationships    Social connections     Talks on phone: Not on file Gets together: Not on file     Attends Anabaptist service: Not on file     Active member of club or organization: Not on file     Attends meetings of clubs or organizations: Not on file     Relationship status: Not on file    Intimate partner violence     Fear of current or ex partner: Not on file     Emotionally abused: Not on file     Physically abused: Not on file     Forced sexual activity: Not on file   Other Topics Concern    Not on file   Social History Narrative    Not on file         REVIEW OF SYSTEMS:         Review of Systems   Constitutional: Positive for fatigue. Negative for appetite change and unexpected weight change. HENT: Positive for sore throat (patient states not sore but almost like a buldge) and trouble swallowing. Negative for dental problem, postnasal drip, sinus pressure and voice change. Eyes: Negative. Negative for visual disturbance. Respiratory: Positive for cough. Negative for choking and wheezing. Cardiovascular: Positive for palpitations. Negative for chest pain and leg swelling. Gastrointestinal: Positive for abdominal pain (belly button area). Negative for abdominal distention, anal bleeding, blood in stool, constipation, diarrhea, nausea, rectal pain and vomiting. Genitourinary: Negative. Negative for difficulty urinating. Musculoskeletal: Negative. Negative for arthralgias, back pain, gait problem and myalgias. Allergic/Immunologic: Negative. Negative for environmental allergies and food allergies. Neurological: Positive for light-headedness (occasionally). Negative for dizziness, weakness, numbness and headaches. Hematological: Negative. Does not bruise/bleed easily. Psychiatric/Behavioral: Negative for sleep disturbance. The patient is nervous/anxious. PHYSICAL EXAMINATION: Vital signs reviewed per the nursing documentation.      Temp 97.2 °F (36.2 °C)   Ht 6' 3\" (1.905 m)   Wt 268 lb 1.6 oz (121.6 kg)   BMI 33.51 kg/m²   Body mass index is 33.51 kg/m². Physical Exam  Vitals signs and nursing note reviewed. Constitutional:       General: He is not in acute distress. Appearance: He is well-developed, well-groomed and overweight. HENT:      Head: Normocephalic and atraumatic. Mouth/Throat:      Pharynx: No oropharyngeal exudate. Eyes:      General: No scleral icterus. Conjunctiva/sclera: Conjunctivae normal.      Pupils: Pupils are equal, round, and reactive to light. Neck:      Musculoskeletal: Normal range of motion and neck supple. Thyroid: No thyromegaly. Trachea: No tracheal deviation. Cardiovascular:      Rate and Rhythm: Normal rate and regular rhythm. Heart sounds: Normal heart sounds. No murmur. Pulmonary:      Effort: Pulmonary effort is normal. No respiratory distress. Breath sounds: Normal breath sounds. No wheezing or rales. Abdominal:      General: Bowel sounds are normal. There is no distension. Palpations: Abdomen is soft. There is no hepatomegaly or mass. Tenderness: There is no abdominal tenderness. There is no guarding. Hernia: No hernia is present. Comments: No peripheral signs of ch. Liver disease   Genitourinary:     Rectum: Normal.   Lymphadenopathy:      Cervical: No cervical adenopathy. Skin:     General: Skin is warm and dry. Findings: No erythema or rash. Neurological:      Mental Status: He is alert and oriented to person, place, and time. Cranial Nerves: No cranial nerve deficit. Psychiatric:         Thought Content:  Thought content normal.           LABORATORY DATA: Reviewed  Lab Results   Component Value Date    WBC 11.0 01/16/2020    HGB 13.6 01/16/2020    HCT 40.3 (L) 01/16/2020    MCV 94.4 01/16/2020     01/16/2020     01/16/2020    K 4.0 01/16/2020     01/16/2020    CO2 24 01/16/2020    BUN 7 01/16/2020    CREATININE 1.03 01/16/2020    LABALBU 4.0 01/15/2020    BILITOT 0.30 01/15/2020    ALKPHOS 103 01/15/2020 entered by the MA/LPN. I independently performed an evaluation on Ollie King. I have reviewed the above documentation completed by the Nurse Practitioner and agree with the documented findings and plan of care. I have personally evaluated this patient with the APRN and have completed at least one if not all key elements of the E/M (history, physical exam, and MDM). Please see my additional contributions to the HPI, physical exam, assessment, and medical decision making. Patient has been taking budesonide 6 mg a day in the last 1 year. He is known to have eosinophilic esophagitis and was not getting better with fluticasone therapy. Patient is advised to continue present therapy. He may need EGD gallate esophageal stricture, and to obtain biopsies from the esophagus. After discussion patient understood and agreed.     Guevara Woodall MD,FACP, [facility]  Board Certified in Gastroenterology and [address] [place] [address] Gastroenterology  Office #: [phone]

## 2020-08-26 ENCOUNTER — TELEPHONE (OUTPATIENT)
Dept: GASTROENTEROLOGY | Age: 40
End: 2020-08-26

## 2020-08-26 NOTE — TELEPHONE ENCOUNTER
Writer spoke to pt over the phone informing pt he has been sched for covid testing at [facility] 9/8/20 @ 10:50am.  Pt is sched on 9/8/20 d/t the 7th is Labor Day. Pt given address and instructed where to report. Pt voices his understanding.

## 2020-08-31 RX ORDER — BUDESONIDE 3 MG/1
6 CAPSULE, COATED PELLETS ORAL EVERY MORNING
Qty: 180 CAPSULE | Refills: 0 | Status: SHIPPED | OUTPATIENT
Start: 2020-08-31 | End: 2020-11-24

## 2020-09-08 ENCOUNTER — HOSPITAL ENCOUNTER (OUTPATIENT)
Dept: PREADMISSION TESTING | Age: 40
Setting detail: SPECIMEN
Discharge: HOME | End: 2020-09-12
Payer: COMMERCIAL

## 2020-09-08 PROCEDURE — U0003 INFECTIOUS AGENT DETECTION BY NUCLEIC ACID (DNA OR RNA); SEVERE ACUTE RESPIRATORY SYNDROME CORONAVIRUS 2 (SARS-COV-2) (CORONAVIRUS DISEASE [COVID-19]), AMPLIFIED PROBE TECHNIQUE, MAKING USE OF HIGH THROUGHPUT TECHNOLOGIES AS DESCRIBED BY CMS-2020-01-R: HCPCS

## 2020-09-09 LAB — SARS-COV-2, NAA: NOT DETECTED

## 2020-09-10 ENCOUNTER — TELEPHONE (OUTPATIENT)
Dept: FAMILY MEDICINE CLINIC | Age: 40
End: 2020-09-10

## 2020-09-11 ENCOUNTER — HOSPITAL ENCOUNTER (OUTPATIENT)
Age: 40
Setting detail: OUTPATIENT SURGERY
Discharge: HOME | End: 2020-09-11
Attending: INTERNAL MEDICINE | Admitting: INTERNAL MEDICINE
Payer: COMMERCIAL

## 2020-09-11 ENCOUNTER — ANESTHESIA EVENT (OUTPATIENT)
Dept: ENDOSCOPY | Age: 40
End: 2020-09-11
Payer: COMMERCIAL

## 2020-09-11 ENCOUNTER — ANESTHESIA (OUTPATIENT)
Dept: ENDOSCOPY | Age: 40
End: 2020-09-11
Payer: COMMERCIAL

## 2020-09-11 VITALS
DIASTOLIC BLOOD PRESSURE: 92 MMHG | TEMPERATURE: 97.7 F | HEIGHT: 75 IN | SYSTOLIC BLOOD PRESSURE: 152 MMHG | OXYGEN SATURATION: 99 % | WEIGHT: 265 LBS | BODY MASS INDEX: 32.95 KG/M2 | RESPIRATION RATE: 19 BRPM | HEART RATE: 101 BPM

## 2020-09-11 VITALS — SYSTOLIC BLOOD PRESSURE: 157 MMHG | OXYGEN SATURATION: 96 % | DIASTOLIC BLOOD PRESSURE: 111 MMHG

## 2020-09-11 PROCEDURE — 6360000002 HC RX W HCPCS: Performed by: NURSE ANESTHETIST, CERTIFIED REGISTERED

## 2020-09-11 PROCEDURE — 3609012400 HC EGD TRANSORAL BIOPSY SINGLE/MULTIPLE: Performed by: INTERNAL MEDICINE

## 2020-09-11 PROCEDURE — 3700000000 HC ANESTHESIA ATTENDED CARE: Performed by: INTERNAL MEDICINE

## 2020-09-11 PROCEDURE — 3700000001 HC ADD 15 MINUTES (ANESTHESIA): Performed by: INTERNAL MEDICINE

## 2020-09-11 PROCEDURE — 7100000001 HC PACU RECOVERY - ADDTL 15 MIN: Performed by: INTERNAL MEDICINE

## 2020-09-11 PROCEDURE — 88305 TISSUE EXAM BY PATHOLOGIST: CPT

## 2020-09-11 PROCEDURE — 2709999900 HC NON-CHARGEABLE SUPPLY: Performed by: INTERNAL MEDICINE

## 2020-09-11 PROCEDURE — 7100000000 HC PACU RECOVERY - FIRST 15 MIN: Performed by: INTERNAL MEDICINE

## 2020-09-11 PROCEDURE — 2500000003 HC RX 250 WO HCPCS: Performed by: NURSE ANESTHETIST, CERTIFIED REGISTERED

## 2020-09-11 PROCEDURE — 43239 EGD BIOPSY SINGLE/MULTIPLE: CPT | Performed by: INTERNAL MEDICINE

## 2020-09-11 PROCEDURE — 2580000003 HC RX 258: Performed by: ANESTHESIOLOGY

## 2020-09-11 RX ORDER — MIDAZOLAM HYDROCHLORIDE 1 MG/ML
INJECTION INTRAMUSCULAR; INTRAVENOUS PRN
Status: DISCONTINUED | OUTPATIENT
Start: 2020-09-11 | End: 2020-09-11 | Stop reason: SDUPTHER

## 2020-09-11 RX ORDER — PROPOFOL 10 MG/ML
INJECTION, EMULSION INTRAVENOUS PRN
Status: DISCONTINUED | OUTPATIENT
Start: 2020-09-11 | End: 2020-09-11 | Stop reason: SDUPTHER

## 2020-09-11 RX ORDER — LABETALOL HYDROCHLORIDE 5 MG/ML
5 INJECTION, SOLUTION INTRAVENOUS EVERY 10 MIN PRN
Status: DISCONTINUED | OUTPATIENT
Start: 2020-09-11 | End: 2020-09-11 | Stop reason: HOSPADM

## 2020-09-11 RX ORDER — OXYCODONE HYDROCHLORIDE AND ACETAMINOPHEN 5; 325 MG/1; MG/1
2 TABLET ORAL PRN
Status: DISCONTINUED | OUTPATIENT
Start: 2020-09-11 | End: 2020-09-11 | Stop reason: HOSPADM

## 2020-09-11 RX ORDER — SODIUM CHLORIDE, SODIUM LACTATE, POTASSIUM CHLORIDE, CALCIUM CHLORIDE 600; 310; 30; 20 MG/100ML; MG/100ML; MG/100ML; MG/100ML
INJECTION, SOLUTION INTRAVENOUS CONTINUOUS
Status: DISCONTINUED | OUTPATIENT
Start: 2020-09-11 | End: 2020-09-11 | Stop reason: HOSPADM

## 2020-09-11 RX ORDER — ONDANSETRON 2 MG/ML
4 INJECTION INTRAMUSCULAR; INTRAVENOUS
Status: DISCONTINUED | OUTPATIENT
Start: 2020-09-11 | End: 2020-09-11 | Stop reason: HOSPADM

## 2020-09-11 RX ORDER — LIDOCAINE HYDROCHLORIDE 20 MG/ML
INJECTION, SOLUTION EPIDURAL; INFILTRATION; INTRACAUDAL; PERINEURAL PRN
Status: DISCONTINUED | OUTPATIENT
Start: 2020-09-11 | End: 2020-09-11 | Stop reason: SDUPTHER

## 2020-09-11 RX ORDER — DIPHENHYDRAMINE HYDROCHLORIDE 50 MG/ML
12.5 INJECTION INTRAMUSCULAR; INTRAVENOUS
Status: DISCONTINUED | OUTPATIENT
Start: 2020-09-11 | End: 2020-09-11 | Stop reason: HOSPADM

## 2020-09-11 RX ORDER — LIDOCAINE HYDROCHLORIDE 20 MG/ML
INJECTION, SOLUTION EPIDURAL; INFILTRATION; INTRACAUDAL; PERINEURAL PRN
Status: DISCONTINUED | OUTPATIENT
Start: 2020-09-11 | End: 2020-09-11

## 2020-09-11 RX ORDER — OXYCODONE HYDROCHLORIDE AND ACETAMINOPHEN 5; 325 MG/1; MG/1
1 TABLET ORAL PRN
Status: DISCONTINUED | OUTPATIENT
Start: 2020-09-11 | End: 2020-09-11 | Stop reason: HOSPADM

## 2020-09-11 RX ADMIN — SODIUM CHLORIDE, POTASSIUM CHLORIDE, SODIUM LACTATE AND CALCIUM CHLORIDE: 600; 310; 30; 20 INJECTION, SOLUTION INTRAVENOUS at 09:45

## 2020-09-11 RX ADMIN — MIDAZOLAM 2 MG: 1 INJECTION INTRAMUSCULAR; INTRAVENOUS at 10:25

## 2020-09-11 RX ADMIN — PROPOFOL 200 MG: 10 INJECTION, EMULSION INTRAVENOUS at 10:25

## 2020-09-11 RX ADMIN — LIDOCAINE HYDROCHLORIDE 100 MG: 20 INJECTION, SOLUTION EPIDURAL; INFILTRATION; INTRACAUDAL at 10:25

## 2020-09-11 ASSESSMENT — PULMONARY FUNCTION TESTS
PIF_VALUE: 0

## 2020-09-11 ASSESSMENT — PAIN DESCRIPTION - DESCRIPTORS: DESCRIPTORS: CRAMPING

## 2020-09-11 ASSESSMENT — PAIN DESCRIPTION - LOCATION: LOCATION: ABDOMEN

## 2020-09-11 ASSESSMENT — PAIN DESCRIPTION - PAIN TYPE: TYPE: ACUTE PAIN

## 2020-09-11 ASSESSMENT — PAIN SCALES - GENERAL: PAINLEVEL_OUTOF10: 0

## 2020-09-11 ASSESSMENT — PAIN - FUNCTIONAL ASSESSMENT: PAIN_FUNCTIONAL_ASSESSMENT: 0-10

## 2020-09-11 NOTE — OP NOTE
ESOPHAGOGASTRODUODENOSCOPY   ( EGD )  DATE OF PROCEDURE: 9/11/2020     SURGEON: Genoveva Sneed MD    ASSISTANT: None    PREOPERATIVE DIAGNOSIS: Intermittent dysphagia. Known to have eosinophilic esophagitis. Procedure performed to evaluate esophageal pathology    POSTOPERATIVE DIAGNOSIS: Minimal changes of eosinophilic esophagitis no strictures seen. OPERATION: Upper GI endoscopy with Biopsy    ANESTHESIA: MAC    ESTIMATED BLOOD LOSS: None    COMPLICATIONS: None. SPECIMENS:  Was Obtained: Multiple biopsies from the mid and upper esophagus to evaluate eosinophilic esophagitis    HISTORY: The patient is a 36y.o. year old male with history of above preop diagnosis. I recommended esophagogastroduodenoscopy with possible biopsy and I explained the risk, benefits, expected outcome, and alternatives to the procedure. Risks included but are not limited to bleeding, infection, respiratory distress, hypotension, and perforation of the esophagus, stomach, or duodenum. Patient understands and is in agreement. PROCEDURE: The patient was given IV conscious sedation. The patient's SPO2 remained above 90% throughout the procedure. Cetacaine spray given. Patient placed in left lateral position. Olympus  videogastroscope was inserted orally under vision into the esophagus without difficulty and advanced into the stomach then through the pylorus up to the second part of duodenum. Findings:    Retropharyngeal area was grossly normal appearing    Esophagus: abnormal: Minimal changes of eosinophilic esophagitis including long furrows. No strictures seen. Squamocolumnar junction is at about 44 cm and lower esophageal sphincter opens normally. No signs of Wilson's mucosa or peptic esophagitis seen. Multiple biopsies taken from the mid and upper esophagus to evaluate eosinophilic esophagitis.     Stomach:    Fundus and Cardia Examined in Retroflexed View: normal    Body: normal    Antrum: normal    Duodenum:     Descending: normal    Bulb: normal    While withdrawing the scope the above findings were verified and the scope was removed. The patient has tolerated the procedure without unusual events. Recommendations/Plan:   1. F/U Biopsies  2. F/U In Office as instructed  3.  Discussed with the family                   Electronically signed by Janice Solis MD  on 9/11/2020 at 10:33 AM

## 2020-09-11 NOTE — ANESTHESIA POSTPROCEDURE EVALUATION
Department of Anesthesiology  Postprocedure Note    Patient: Daniel Barr  MRN: [id number]  YOB: 1980  Date of evaluation: 9/11/2020  Time:  12:04 PM     Procedure Summary     Date:  09/11/20 Room / Location:  [facility] ENDO 01 / [facility]    Anesthesia Start:  1022 Anesthesia Stop:  9865    Procedure:  EGD BIOPSY (N/A Esophagus) Diagnosis:       (EOSINOPHILIC ESOPHAGITIS)      (PAT ON ADMIT)    Surgeon:  Bradley Winslow MD Responsible Provider:  Caroline Moraes MD    Anesthesia Type:  general ASA Status:  3          Anesthesia Type: general    Kaia Phase I: Kaia Score: 10    Kaia Phase II:      Last vitals: Reviewed and per EMR flowsheets.        Anesthesia Post Evaluation    Comments: POST- ANESTHESIA EVALUATION       Pt Name: Daniel Barr  MRN: [id number]  YOB: 1980  Date of evaluation: 9/11/2020  Time:  12:04 PM      BP (!) 152/92   Pulse 101   Temp 97.7 °F (36.5 °C) (Infrared)   Resp 19   Ht 6' 3\" (1.905 m)   Wt 265 lb (120.2 kg)   SpO2 99%   BMI 33.12 kg/m²      Consciousness Level  Awake  Cardiopulmonary Status  Stable  Pain Adequately Treated YES  Nausea / Vomiting  NO  Adequate Hydration  YES  Anesthesia Related Complications NONE      Electronically signed by Caroline Moraes MD on 9/11/2020 at 12:04 PM

## 2020-09-11 NOTE — H&P
HISTORY and [id number]       NAME:  Johanna Marcus  MRN: [id number]   YOB: 1980   Date: 9/11/2020   Age: 36 y.o. Gender: male       COMPLAINT AND PRESENT HISTORY:            Johanna Marcus is 36 y.o., male, undergoing preadmission testing for EGD. History of eosinophilic esophagitis initially diagnosed several years ago. Pt taking budesonide with no relief. Reports hoarseness. Feeling of a lump in his throat at all times. Reports dysphagia with food. Reports reflux after eating. Denies nausea, vomiting, diarrhea, constipation, hematochezia or melena. NPO. Took losartan this am with sip of water. No blood thinners for last 7 days. Reports occasional SOB and palpitations. Saw his PCP yesterday. Reports his BP has been elevated. Was started on a diuretic in addition to his antihypertensive yesterday. Denies recent or current chest pain/pressure, recent URI, fever or chills. PAST MEDICAL HISTORY     Past Medical History:   Diagnosis Date    History of kidney stones     Hypertension        SURGICAL HISTORY       Past Surgical History:   Procedure Laterality Date    COLONOSCOPY  12/08/2017    No lesions seen up to the cecum and a couple of inches of ileum. Random biopsies taken. , pathology--LARGE INTESTINAL TYPE MUCOSA DEMONSTRATING NO SIGNIFICANT     CYSTO/URETERO/PYELOSCOPY, CALCULUS TX Right 1/24/2020    HOLMIUM LASER LITHO, CYSTO, RIGHT URETEROSCOPY, RIGHT STENT EXCHANGE, STONE BASKETING performed by Byron Myles MD at [address] [facility] [address] [place] 1/16/2020    CYSTOSCOPY URETERAL STENT INSERTION performed by Byron Myles MD at [address] [facility] Right 01/24/2020    HOLMIUM LASER LITHO, CYSTO, RIGHT URETEROSCOPY, RIGHT STENT EXCHANGE, STONE BASKETING    HERNIA REPAIR Left 07/27/2016    Dr Kalyani Pak N/A 6/9/2020    HERNIA VENTRAL REPAIR LAPAROSCOPIC ROBOTIC XI W/MESH performed by Zeynep Reza MD at [address] [facility] COLONOSCOPY W/BIOPSY SINGLE/MULTIPLE N/A 12/8/2017    COLONOSCOPY WITH BIOPSY performed by Sage Vega MD at [address] EGD TRANSORAL BIOPSY SINGLE/MULTIPLE N/A 12/8/2017    EGD BIOPSY performed by Sage Vega MD at [address] [facility]  05/26/2009    pathology-focal active esophagitis, showing prominent eosinophilia-up to 50 esonophils per high power field    UPPER GASTROINTESTINAL ENDOSCOPY  12/08/2017    Probable eosinophilic esophagitis no stricture seen, pathology-SQUAMOUS EPITHELIUM WITH EOSINOPHILIA (SEE COMMENT)     UPPER GASTROINTESTINAL ENDOSCOPY N/A 7/6/2018    EGD BIOPSY performed by Sage Vega MD at [address] [facility] N/A 7/19/2019    EGD BIOPSY performed by Sage Vega MD at [address] [place]       Family History   Problem Relation Age of Onset    Cancer Mother     High Cholesterol Father     High Cholesterol Brother        SOCIAL HISTORY       Social History     Socioeconomic History    Marital status:      Spouse name: Not on file    Number of children: 2    Years of education: Not on file    Highest education level: Not on file   Occupational History    Not on file   Social Needs    Financial resource strain: Not on file    Food insecurity     Worry: Not on file     Inability: Not on file   Vocation needs     Medical: Not on file     Non-medical: Not on file   Tobacco Use    Smoking status: Never Smoker    Smokeless tobacco: Former User     Types: Chew   Substance and Sexual Activity    Alcohol use:  Yes     Alcohol/week: 2.0 standard drinks     Types: 2 Shots of liquor per week     Comment: twice a week    Drug use: No    Sexual activity: Not on file   Lifestyle    Physical activity     Days per week: Not on file     Minutes per session: Not on file    Stress: Not on file   Relationships    Social connections     Talks on phone: Not on file 02/08/2018    Essential hypertension 01/18/2018    Loose stools 11/06/2017    Testicular pain, left 11/06/2017    Neck pain 01/06/2017    Rectal bleeding 12/13/2016    Shoulder pain, left 06/30/2016    Anxiety 03/03/2014           PATRICIA Hinkle - CNP on 9/11/2020 at 9:18 AM

## 2020-09-11 NOTE — ANESTHESIA PRE PROCEDURE
Department of Anesthesiology  Preprocedure Note       Name:  Alejandro Oconnor   Age:  36 y.o.  :  1980                                          MRN:  [id number]         Date:  2020      Surgeon: Melanie Degree):  Mery Gaming MD    Procedure: Procedure(s):  EGD    Medications prior to admission:   Prior to Admission medications    Medication Sig Start Date End Date Taking? Authorizing Provider   budesonide (ENTOCORT EC) 3 MG extended release capsule Take 2 capsules by mouth every morning 20  PATRICIA Sanchez NP   ibuprofen (ADVIL;MOTRIN) 800 MG tablet Take 1 tablet by mouth every 8 hours as needed for Pain  Patient not taking: Reported on 2020 1/15/20   Colleen Roper DO   omeprazole (PRILOSEC) 40 MG delayed release capsule take 1 capsule by mouth once daily 10/29/19   PATRICIA Sanchez NP   losartan (COZAAR) 25 MG tablet Take 25 mg by mouth daily    Historical Provider, MD   buPROPion (WELLBUTRIN XL) 300 MG extended release tablet Take 1 tablet by mouth every morning 18   Yuriy De Souza MD   Cholecalciferol (VITAMIN D3) 24863 units TABS One tab weekly 18   Yuriy De Souza MD   RA ALLERGY RELIEF 10 MG tablet 1 tablet daily 18   Historical Provider, MD       Current medications:    Current Facility-Administered Medications   Medication Dose Route Frequency Provider Last Rate Last Dose    lactated ringers infusion   Intravenous Continuous Scarord MD Adiel           Allergies:     Allergies   Allergen Reactions    Chicken Allergy      Other reaction(s): Throat issue    Naproxen Nausea Only    Peanut-Containing Drug Products      Other reaction(s): Throat issue       Problem List:    Patient Active Problem List   Diagnosis Code    Anxiety F41.9    Shoulder pain, left M25.512    Rectal bleeding K62.5    Neck pain M54.2    Loose stools R19.5    Testicular pain, left N50.812    Essential hypertension I37    Eosinophilic esophagitis D27.1    Pharyngoesophageal dysphagia R13.14    Elevated ALT measurement R74.0    Tiredness R53.83    Hydroureteronephrosis N13.30    Kidney stone N20.0    Hypocalcemia E83.51    Labile blood pressure R09.89    Ventral hernia without obstruction or gangrene K43.9       Past Medical History:        Diagnosis Date    History of kidney stones     Hypertension        Past Surgical History:        Procedure Laterality Date    COLONOSCOPY  12/08/2017    No lesions seen up to the cecum and a couple of inches of ileum. Random biopsies taken. , pathology--LARGE INTESTINAL TYPE MUCOSA DEMONSTRATING NO SIGNIFICANT     CYSTO/URETERO/PYELOSCOPY, CALCULUS TX Right 1/24/2020    HOLMIUM LASER LITHO, CYSTO, RIGHT URETEROSCOPY, RIGHT STENT EXCHANGE, STONE BASKETING performed by Ines Lord MD at [address] [facility] Right 1/16/2020    CYSTOSCOPY URETERAL STENT INSERTION performed by Ines Lord MD at [address] [facility] Right 01/24/2020    HOLMIUM LASER LITHO, CYSTO, RIGHT URETEROSCOPY, RIGHT STENT EXCHANGE, STONE BASKETING    HERNIA REPAIR Left 07/27/2016    Dr Willam Starks N/A 6/9/2020    HERNIA VENTRAL REPAIR LAPAROSCOPIC ROBOTIC XI W/MESH performed by Josh Madera MD at [address] [facility] W/BIOPSY SINGLE/MULTIPLE N/A 12/8/2017    COLONOSCOPY WITH BIOPSY performed by Estephania Marsh MD at [address] [facility] EGD TRANSORAL BIOPSY SINGLE/MULTIPLE N/A 12/8/2017    EGD BIOPSY performed by Estephania Marsh MD at [address] [facility]  05/26/2009    pathology-focal active esophagitis, showing prominent eosinophilia-up to 50 esonophils per high power field    UPPER GASTROINTESTINAL ENDOSCOPY  12/08/2017    Probable eosinophilic esophagitis no stricture seen, pathology-SQUAMOUS EPITHELIUM WITH EOSINOPHILIA (SEE COMMENT)     UPPER GASTROINTESTINAL ENDOSCOPY N/A 7/6/2018    EGD BIOPSY performed by Estephania Marsh MD at [address] [facility]    UPPER GASTROINTESTINAL ENDOSCOPY N/A 7/19/2019    EGD BIOPSY performed by Vicki Ruasch MD at [address] History:    Social History     Tobacco Use    Smoking status: Never Smoker    Smokeless tobacco: Former User     Types: Chew   Substance Use Topics    Alcohol use: Yes     Alcohol/week: 2.0 standard drinks     Types: 2 Shots of liquor per week     Comment: twice a week                                Counseling given: Not Answered      Vital Signs (Current):   Vitals:    09/11/20 0908   BP: (!) 159/109   Pulse: 89   Resp: 16   Temp: 97.7 °F (36.5 °C)   TempSrc: Infrared   SpO2: 97%   Weight: 265 lb (120.2 kg)   Height: 6' 3\" (1.905 m)                                              BP Readings from Last 3 Encounters:   09/11/20 (!) 159/109   06/09/20 (!) 171/102   06/09/20 (!) 141/87       NPO Status:                                                                                 BMI:   Wt Readings from Last 3 Encounters:   09/11/20 265 lb (120.2 kg)   08/25/20 268 lb 1.6 oz (121.6 kg)   06/09/20 265 lb (120.2 kg)     Body mass index is 33.12 kg/m². CBC:   Lab Results   Component Value Date    WBC 11.0 01/16/2020    RBC 4.27 01/16/2020    HGB 13.6 01/16/2020    HCT 40.3 01/16/2020    MCV 94.4 01/16/2020    RDW 13.5 01/16/2020     01/16/2020       CMP:   Lab Results   Component Value Date     01/16/2020    K 4.0 01/16/2020     01/16/2020    CO2 24 01/16/2020    BUN 7 01/16/2020    CREATININE 1.03 01/16/2020    GFRAA >60 01/16/2020    LABGLOM >60 01/16/2020    GLUCOSE 112 01/16/2020    PROT 7.2 01/15/2020    CALCIUM 8.2 01/16/2020    BILITOT 0.30 01/15/2020    ALKPHOS 103 01/15/2020    AST 26 01/15/2020    ALT 41 01/15/2020       POC Tests: No results for input(s): POCGLU, POCNA, POCK, POCCL, POCBUN, POCHEMO, POCHCT in the last 72 hours.     Coags: No results found for: PROTIME, INR, APTT    HCG (If Applicable): No results found for: PREGTESTUR, PREGSERUM, HCG, HCGQUANT ABGs: No results found for: PHART, PO2ART, XAR1ECH, CLZ3AYV, BEART, Z2BSUOHI     Type & Screen (If Applicable):  No results found for: LABABO, LABRH    Drug/Infectious Status (If Applicable):  No results found for: HIV, HEPCAB    COVID-19 Screening (If Applicable):   Lab Results   Component Value Date    COVID19 Not Detected 09/08/2020         Anesthesia Evaluation  Patient summary reviewed and Nursing notes reviewed no history of anesthetic complications:   Airway: Mallampati: III  TM distance: >3 FB   Neck ROM: full  Mouth opening: > = 3 FB Dental: normal exam         Pulmonary:normal exam  breath sounds clear to auscultation  (+) sleep apnea: on CPAP,                             Cardiovascular:    (+) hypertension:,       ECG reviewed  Rhythm: regular  Rate: normal                    Neuro/Psych:   Negative Neuro/Psych ROS              GI/Hepatic/Renal:   (+) GERD:, renal disease: kidney stones,          ROS comment:  Eosinophilic esophagitis   Pharyngoesophageal dysphagia. Endo/Other: Negative Endo/Other ROS                    Abdominal:           Vascular: negative vascular ROS. Anesthesia Plan      general     ASA 3       Induction: intravenous. MIPS: Prophylactic antiemetics administered. Anesthetic plan and risks discussed with patient. Plan discussed with CRNA.                 Guillaume Gaona MD   9/11/2020

## 2020-09-14 LAB — SURGICAL PATHOLOGY REPORT: NORMAL

## 2020-09-25 ENCOUNTER — OFFICE VISIT (OUTPATIENT)
Dept: GASTROENTEROLOGY | Age: 40
End: 2020-09-25
Payer: COMMERCIAL

## 2020-09-25 VITALS — BODY MASS INDEX: 33.5 KG/M2 | WEIGHT: 268 LBS

## 2020-09-25 PROCEDURE — 99213 OFFICE O/P EST LOW 20 MIN: CPT | Performed by: NURSE PRACTITIONER

## 2020-09-25 ASSESSMENT — ENCOUNTER SYMPTOMS
ANAL BLEEDING: 0
TROUBLE SWALLOWING: 1
ABDOMINAL PAIN: 1
DIARRHEA: 0
CHOKING: 0
COUGH: 1
BLOOD IN STOOL: 0
NAUSEA: 0
VOMITING: 0
VOICE CHANGE: 0
ABDOMINAL DISTENTION: 0
SINUS PRESSURE: 0
EYES NEGATIVE: 1
ALLERGIC/IMMUNOLOGIC NEGATIVE: 1
WHEEZING: 0
BACK PAIN: 0
CONSTIPATION: 0
SORE THROAT: 1
RECTAL PAIN: 0

## 2020-09-25 NOTE — PROGRESS NOTES
GI CLINIC FOLLOW UP    INTERVAL HISTORY:   No referring provider defined for this encounter. Chief Complaint   Patient presents with    EGD     EGD done 08/25/20 still having throat issues burning when he drinks soda  and food feels like its in there. Prilosec  helps with the indigestion. HISTORY OF PRESENT ILLNESS:     Patient being seen for follow-up EGD to evaluate progression of Eosinophilic Esophagitis. EGD revealed minimal changes of eosinophilic esophagitis; no strictures seen. Biopsies from the mid and upper esophagus revealed 100 Eosinophils per HPF. This is improved from last year where he had close to 200 eosinophils per high powered field. He has been on Budesonide 6 mg daily for the past 1 year. Patient reports some epigastric discomfort when drinking carbonated beverages. Reports PPI helps. Occasional dysphagia with solids, may 1-2 times a week. No trouble with liquids. No signs of complete obstruction. Bowel movements satisfactory. No melena, hematochezia. Past Medical,Family, and Social History reviewed and does contribute to the patient presentingcondition. Patient's PMH/PSH,SH,PSYCH Hx, MEDs, ALLERGIES, and ROS were all reviewed and updated in the appropriate sections. PAST MEDICAL HISTORY:  Past Medical History:   Diagnosis Date    History of kidney stones     Hypertension     Sleep apnea     uses CPAP nightly       Past Surgical History:   Procedure Laterality Date    COLONOSCOPY  12/08/2017    No lesions seen up to the cecum and a couple of inches of ileum. Random biopsies taken. , pathology--LARGE INTESTINAL TYPE MUCOSA DEMONSTRATING NO SIGNIFICANT     CYSTO/URETERO/PYELOSCOPY, CALCULUS TX Right 1/24/2020    HOLMIUM LASER LITHO, CYSTO, RIGHT URETEROSCOPY, RIGHT STENT EXCHANGE, STONE BASKETING performed by Reynold Almeida MD at [address] [place] [address] Right 1/16/2020    CYSTOSCOPY URETERAL STENT INSERTION performed by Maria Del Rosario Summers Rain Singh MD at [address] [facility] Right 01/24/2020    HOLMIUM LASER LITHO, CYSTO, RIGHT URETEROSCOPY, RIGHT STENT EXCHANGE, STONE BASKETING    HERNIA REPAIR Left 07/27/2016    Dr Allie Geiger N/A 6/9/2020    HERNIA VENTRAL REPAIR LAPAROSCOPIC ROBOTIC XI W/MESH performed by Cindy Roldan MD at [address] [facility] W/BIOPSY SINGLE/MULTIPLE N/A 12/8/2017    COLONOSCOPY WITH BIOPSY performed by Mellisa Siddiqui MD at [address] EGD TRANSORAL BIOPSY SINGLE/MULTIPLE N/A 12/8/2017    EGD BIOPSY performed by Mellisa Siddiqui MD at [address] [facility]  05/26/2009    pathology-focal active esophagitis, showing prominent eosinophilia-up to 50 esonophils per high power field    UPPER GASTROINTESTINAL ENDOSCOPY  12/08/2017    Probable eosinophilic esophagitis no stricture seen, pathology-SQUAMOUS EPITHELIUM WITH EOSINOPHILIA (SEE COMMENT)     UPPER GASTROINTESTINAL ENDOSCOPY N/A 7/6/2018    EGD BIOPSY performed by Mellisa Siddiqui MD at [address] [facility] N/A 7/19/2019    EGD BIOPSY performed by Mellisa Siddiqui MD at [place] [address] N/A 9/11/2020    EGD BIOPSY performed by Mellisa Siddiqui MD at [address]:    Current Outpatient Medications:     budesonide (ENTOCORT EC) 3 MG extended release capsule, Take 2 capsules by mouth every morning, Disp: 180 capsule, Rfl: 0    ibuprofen (ADVIL;MOTRIN) 800 MG tablet, Take 1 tablet by mouth every 8 hours as needed for Pain, Disp: 30 tablet, Rfl: 0    omeprazole (PRILOSEC) 40 MG delayed release capsule, take 1 capsule by mouth once daily, Disp: 30 capsule, Rfl: 3    losartan (COZAAR) 25 MG tablet, Take 25 mg by mouth daily, Disp: , Rfl:     buPROPion (WELLBUTRIN XL) 300 MG extended release tablet, Take 1 tablet by mouth every morning, Disp: 30 tablet, Rfl: 3    Cholecalciferol (VITAMIN D3) 20461 units TABS, One tab weekly, Disp: 4 tablet, Rfl: 5    RA ALLERGY RELIEF 10 MG tablet, 1 tablet daily, Disp: , Rfl: 0    ALLERGIES:   Allergies   Allergen Reactions    Chicken Allergy      Other reaction(s): Throat issue    Naproxen Nausea Only    Peanut-Containing Drug Products      Other reaction(s): Throat issue       FAMILY HISTORY:       Problem Relation Age of Onset    Cancer Mother     High Cholesterol Father     High Cholesterol Brother          SOCIAL HISTORY:   Social History     Socioeconomic History    Marital status:      Spouse name: Not on file    Number of children: 2    Years of education: Not on file    Highest education level: Not on file   Occupational History    Not on file   Social Needs    Financial resource strain: Not on file    Food insecurity     Worry: Not on file     Inability: Not on file    Transportation needs     Medical: Not on file     Non-medical: Not on file   Tobacco Use    Smoking status: Never Smoker    Smokeless tobacco: Former User     Types: Chew   Substance and Sexual Activity    Alcohol use:  Yes     Alcohol/week: 2.0 standard drinks     Types: 2 Shots of liquor per week     Comment: once a week    Drug use: No    Sexual activity: Not on file   Lifestyle    Physical activity     Days per week: Not on file     Minutes per session: Not on file    Stress: Not on file   Relationships    Social connections     Talks on phone: Not on file     Gets together: Not on file     Attends Druze service: Not on file     Active member of club or organization: Not on file     Attends meetings of clubs or organizations: Not on file     Relationship status: Not on file    Intimate partner violence     Fear of current or ex partner: Not on file     Emotionally abused: Not on file     Physically abused: Not on file     Forced sexual activity: Not on file   Other Topics Concern    Not on file   Social History Narrative    Not on file       REVIEW OF SYSTEMS: A equal, round, and reactive to light. Neck:      Musculoskeletal: Normal range of motion and neck supple. Thyroid: No thyromegaly. Trachea: No tracheal deviation. Cardiovascular:      Rate and Rhythm: Normal rate and regular rhythm. Heart sounds: Normal heart sounds. No murmur. Pulmonary:      Effort: Pulmonary effort is normal. No respiratory distress. Breath sounds: Normal breath sounds. No wheezing or rales. Abdominal:      General: Bowel sounds are normal. There is no distension. Palpations: Abdomen is soft. There is no hepatomegaly or mass. Tenderness: There is no abdominal tenderness. There is no guarding or rebound. Hernia: No hernia is present. Lymphadenopathy:      Cervical: No cervical adenopathy. Skin:     General: Skin is warm and dry. Findings: No erythema or rash. Neurological:      Mental Status: He is alert and oriented to person, place, and time. Cranial Nerves: No cranial nerve deficit. Psychiatric:         Thought Content: Thought content normal.           LABORATORY DATA: Reviewed  Lab Results   Component Value Date    WBC 11.0 01/16/2020    HGB 13.6 01/16/2020    HCT 40.3 (L) 01/16/2020    MCV 94.4 01/16/2020     01/16/2020     01/16/2020    K 4.0 01/16/2020     01/16/2020    CO2 24 01/16/2020    BUN 7 01/16/2020    CREATININE 1.03 01/16/2020    LABALBU 4.0 01/15/2020    BILITOT 0.30 01/15/2020    ALKPHOS 103 01/15/2020    AST 26 01/15/2020    ALT 41 01/15/2020         Lab Results   Component Value Date    RBC 4.27 (L) 01/16/2020    HGB 13.6 01/16/2020    MCV 94.4 01/16/2020    MCH 31.8 01/16/2020    MCHC 33.7 01/16/2020    RDW 13.5 01/16/2020    MPV 7.1 01/16/2020    BASOPCT 1 09/07/2019    LYMPHSABS 2.80 09/07/2019    MONOSABS 0.70 09/07/2019    NEUTROABS 6.70 09/07/2019    EOSABS 0.70 (H) 09/07/2019    BASOSABS 0.10 09/07/2019         DIAGNOSTIC TESTING:     No results found.        IMPRESSION:      Diagnosis Orders 1. Eosinophilic esophagitis     2. Pharyngoesophageal dysphagia         EGD results discussed with patient. To continue PPI and Budesonide treatment. He needs continued maintenance therapy. Pt was asked to chew food well  Take time in eating  Sit up or prop up when eating  Don't talk when eating  Walk after finish eating  Use liquids with meals if has issues  The patient has verbalized understanding and agreemenet to this. Follow-up 1 year, sooner if needed. Thank you for allowing me to participate in the care of Mr. Wallace Nicole. For any further questions please do not hesitate to contact me. I have reviewed and agree with the ROS entered by the MA/NIKN.        NATHAN Verdugo    [facility]  Office #: [phone]

## 2020-11-24 RX ORDER — BUDESONIDE 3 MG/1
6 CAPSULE, COATED PELLETS ORAL EVERY MORNING
Qty: 180 CAPSULE | Refills: 0 | Status: SHIPPED | OUTPATIENT
Start: 2020-11-24 | End: 2021-02-22

## 2021-02-21 DIAGNOSIS — R13.14 PHARYNGOESOPHAGEAL DYSPHAGIA: Primary | ICD-10-CM

## 2021-02-22 RX ORDER — BUDESONIDE 3 MG/1
6 CAPSULE, COATED PELLETS ORAL EVERY MORNING
Qty: 180 CAPSULE | Refills: 0 | Status: SHIPPED | OUTPATIENT
Start: 2021-02-22 | End: 2021-05-23

## 2021-11-11 ENCOUNTER — OFFICE VISIT (OUTPATIENT)
Dept: GASTROENTEROLOGY | Age: 41
End: 2021-11-11
Payer: COMMERCIAL

## 2021-11-11 VITALS
DIASTOLIC BLOOD PRESSURE: 102 MMHG | HEART RATE: 95 BPM | HEIGHT: 75 IN | BODY MASS INDEX: 33.71 KG/M2 | OXYGEN SATURATION: 96 % | SYSTOLIC BLOOD PRESSURE: 140 MMHG | WEIGHT: 271.1 LBS

## 2021-11-11 DIAGNOSIS — K20.0 EOSINOPHILIC ESOPHAGITIS: ICD-10-CM

## 2021-11-11 DIAGNOSIS — K62.5 RECTAL BLEEDING: Primary | ICD-10-CM

## 2021-11-11 DIAGNOSIS — R11.0 NAUSEA: ICD-10-CM

## 2021-11-11 DIAGNOSIS — R13.14 PHARYNGOESOPHAGEAL DYSPHAGIA: ICD-10-CM

## 2021-11-11 PROCEDURE — 99214 OFFICE O/P EST MOD 30 MIN: CPT | Performed by: NURSE PRACTITIONER

## 2021-11-11 RX ORDER — POLYETHYLENE GLYCOL 3350 17 G/17G
POWDER, FOR SOLUTION ORAL
Qty: 238 G | Refills: 0 | Status: SHIPPED | OUTPATIENT
Start: 2021-11-11

## 2021-11-11 RX ORDER — ATORVASTATIN CALCIUM 10 MG/1
TABLET, FILM COATED ORAL
COMMUNITY
Start: 2021-08-17

## 2021-11-11 RX ORDER — TRAZODONE HYDROCHLORIDE 50 MG/1
TABLET ORAL
COMMUNITY
Start: 2021-10-21

## 2021-11-11 ASSESSMENT — ENCOUNTER SYMPTOMS
CHOKING: 0
RECTAL PAIN: 0
VOMITING: 0
DIARRHEA: 0
SORE THROAT: 0
BLOOD IN STOOL: 0
SHORTNESS OF BREATH: 0
BACK PAIN: 0
ABDOMINAL DISTENTION: 0
WHEEZING: 0
COUGH: 0
CONSTIPATION: 0
ANAL BLEEDING: 1
VOICE CHANGE: 0
ABDOMINAL PAIN: 1
TROUBLE SWALLOWING: 0
APNEA: 0
NAUSEA: 1

## 2021-11-11 NOTE — PROGRESS NOTES
GI CLINIC FOLLOW UP    INTERVAL HISTORY:   No referring provider defined for this encounter. Chief Complaint   Patient presents with    Abdominal Pain     pain every time he eat not long after rectal bleeding started.  Rectal Bleeding    Nausea     coming and going.  Hemorrhoids     had in the past.     Constipation     since the bleeding started.  Hypertension       HISTORY OF PRESENT ILLNESS:     Patient being seen for follow-up. Patient has hx of EoE. Last EGD in 2020. Patient was on Budesonide and PPI. It appears patient stopped taking Budesonide a few months ago ? Recently patient reports epigastric pain, nausea after meals. Denies any significant dysphagia, odynophagia. Has been taking Advil every other day for arm/nerve pain. Two weeks ago patient reported BRBPR with bowel movements x 4, moderate amount. Next day patient reported light spotting. Since then this has resolved. At that time patient reports he had normal bowel movements. Now has intermittent constipation. Past Medical,Family, and Social History reviewed and does contribute to the patient presentingcondition. Patient's PMH/PSH,SH,PSYCH Hx, MEDs, ALLERGIES, and ROS were all reviewed and updated in the appropriate sections. PAST MEDICAL HISTORY:  Past Medical History:   Diagnosis Date    History of kidney stones     Hypertension     Sleep apnea     uses CPAP nightly       Past Surgical History:   Procedure Laterality Date    COLONOSCOPY  12/08/2017    No lesions seen up to the cecum and a couple of inches of ileum. Random biopsies taken. , pathology--LARGE INTESTINAL TYPE MUCOSA DEMONSTRATING NO SIGNIFICANT     CYSTO/URETERO/PYELOSCOPY, CALCULUS TX Right 1/24/2020    HOLMIUM LASER LITHO, CYSTO, RIGHT URETEROSCOPY, RIGHT STENT EXCHANGE, STONE BASKETING performed by Mason Gonzales., MD at [place] [address] Right 1/16/2020    CYSTOSCOPY URETERAL STENT INSERTION performed by Gagandeep Martin Garcia Carballo MD at [address] Right 01/24/2020    HOLMIUM LASER LITHO, CYSTO, RIGHT URETEROSCOPY, RIGHT STENT EXCHANGE, STONE BASKETING    HERNIA REPAIR Left 07/27/2016    Dr Jyotsna Knott N/A 6/9/2020    HERNIA VENTRAL REPAIR LAPAROSCOPIC ROBOTIC XI W/MESH performed by Yonatan Agarwal MD at [address] [facility] W/BIOPSY SINGLE/MULTIPLE N/A 12/8/2017    COLONOSCOPY WITH BIOPSY performed by Bon Holbrook MD at [address] [facility] EGD TRANSORAL BIOPSY SINGLE/MULTIPLE N/A 12/8/2017    EGD BIOPSY performed by Bon Holbrook MD at [address]  05/26/2009    pathology-focal active esophagitis, showing prominent eosinophilia-up to 50 esonophils per high power field    UPPER GASTROINTESTINAL ENDOSCOPY  12/08/2017    Probable eosinophilic esophagitis no stricture seen, pathology-SQUAMOUS EPITHELIUM WITH EOSINOPHILIA (SEE COMMENT)     UPPER GASTROINTESTINAL ENDOSCOPY N/A 7/6/2018    EGD BIOPSY performed by Bon Holbrook MD at [address] N/A 7/19/2019    EGD BIOPSY performed by Bon Holbrook MD at [address] N/A 9/11/2020    EGD BIOPSY performed by Bon Holbrook MD at [address]:    Current Outpatient Medications:     atorvastatin (LIPITOR) 10 MG tablet, , Disp: , Rfl:     omeprazole (PRILOSEC) 40 MG delayed release capsule, take 1 capsule by mouth once daily, Disp: 30 capsule, Rfl: 3    losartan (COZAAR) 25 MG tablet, Take 25 mg by mouth daily, Disp: , Rfl:     buPROPion (WELLBUTRIN XL) 300 MG extended release tablet, Take 1 tablet by mouth every morning, Disp: 30 tablet, Rfl: 3    RA ALLERGY RELIEF 10 MG tablet, 1 tablet daily, Disp: , Rfl: 0    traZODone (DESYREL) 50 MG tablet, take 1 tablet by mouth once daily at bedtime if needed (Patient not taking: Reported on 11/11/2021), Disp: , Rfl:    ibuprofen (ADVIL;MOTRIN) 800 MG tablet, Take 1 tablet by mouth every 8 hours as needed for Pain (Patient not taking: Reported on 11/11/2021), Disp: 30 tablet, Rfl: 0    ALLERGIES:   Allergies   Allergen Reactions    Chicken Allergy      Other reaction(s): Throat issue    Naproxen Nausea Only    Peanut-Containing Drug Products      Other reaction(s): Throat issue       FAMILY HISTORY:       Problem Relation Age of Onset    Cancer Mother     High Cholesterol Father     High Cholesterol Brother          SOCIAL HISTORY:   Social History     Socioeconomic History    Marital status:      Spouse name: Not on file    Number of children: 2    Years of education: Not on file    Highest education level: Not on file   Occupational History    Not on file   Tobacco Use    Smoking status: Never Smoker    Smokeless tobacco: Former User     Types: Chew   Vaping Use    Vaping Use: Never used   Substance and Sexual Activity    Alcohol use: Yes     Alcohol/week: 2.0 standard drinks     Types: 2 Shots of liquor per week     Comment: once a week    Drug use: No    Sexual activity: Not on file   Other Topics Concern    Not on file   Social History Narrative    Not on file     Social Determinants of Health     Financial Resource Strain:     Difficulty of Paying Living Expenses: Not on file   Food Insecurity:     Worried About Running Out of Food in the Last Year: Not on file    Christina of Food in the Last Year: Not on file   Transportation Needs:     Lack of Transportation (Medical): Not on file    Lack of Transportation (Non-Medical):  Not on file   Physical Activity:     Days of Exercise per Week: Not on file    Minutes of Exercise per Session: Not on file   Stress:     Feeling of Stress : Not on file   Social Connections:     Frequency of Communication with Friends and Family: Not on file    Frequency of Social Gatherings with Friends and Family: Not on file    Attends Spiritism Services: Not on file  Active Member of Clubs or Organizations: Not on file    Attends Club or Organization Meetings: Not on file    Marital Status: Not on file   Intimate Partner Violence:     Fear of Current or Ex-Partner: Not on file    Emotionally Abused: Not on file    Physically Abused: Not on file    Sexually Abused: Not on file   Housing Stability:     Unable to Pay for Housing in the Last Year: Not on file    Number of [place] in the Last Year: Not on file    Unstable Housing in the Last Year: Not on file       REVIEW OF SYSTEMS: A 12-point review of systemswas obtained and pertinent positives and negatives were enumerated above in the history of present illness. All other reviewed systems / symptoms were negative. Review of Systems   Constitutional: Negative for appetite change, fatigue and fever. HENT: Negative for mouth sores, sore throat, trouble swallowing and voice change. Eyes:        No ictirus   Respiratory: Negative for apnea, cough, choking, shortness of breath and wheezing. Cardiovascular: Negative for chest pain, palpitations and leg swelling. Gastrointestinal: Positive for abdominal pain, anal bleeding and nausea. Negative for abdominal distention, blood in stool, constipation, diarrhea, rectal pain and vomiting. Endocrine: Negative for polydipsia, polyphagia and polyuria. Genitourinary: Negative for frequency, hematuria and urgency. Musculoskeletal: Negative for arthralgias, back pain, gait problem and joint swelling. Skin: Negative for pallor and rash. Allergic/Immunologic: Negative for food allergies. Neurological: Negative for dizziness, seizures, weakness and headaches. Hematological: Negative for adenopathy. Does not bruise/bleed easily. Psychiatric/Behavioral: Negative for sleep disturbance. The patient is not nervous/anxious. PHYSICAL EXAMINATION: Vital signs reviewed per the nursing documentation.      BP (!) 140/102   Pulse 95   Ht 6' 3\" (1.905 m) Wt 271 lb 1.6 oz (123 kg)   SpO2 96%   BMI 33.89 kg/m²   Body mass index is 33.89 kg/m². Physical Exam  Constitutional:       Appearance: Normal appearance. Eyes:      General: No scleral icterus. Pupils: Pupils are equal, round, and reactive to light. Cardiovascular:      Rate and Rhythm: Normal rate and regular rhythm. Heart sounds: Normal heart sounds. Pulmonary:      Effort: Pulmonary effort is normal.      Breath sounds: Normal breath sounds. Abdominal:      General: Bowel sounds are normal. There is no distension. Palpations: Abdomen is soft. There is no mass. Tenderness: There is no abdominal tenderness. There is no guarding. Skin:     General: Skin is warm and dry. Coloration: Skin is not jaundiced. Neurological:      Mental Status: He is alert and oriented to person, place, and time. Mental status is at baseline. LABORATORY DATA: Reviewed  Lab Results   Component Value Date    WBC 11.0 01/16/2020    HGB 13.6 01/16/2020    HCT 40.3 (L) 01/16/2020    MCV 94.4 01/16/2020     01/16/2020     01/16/2020    K 4.0 01/16/2020     01/16/2020    CO2 24 01/16/2020    BUN 7 01/16/2020    CREATININE 1.03 01/16/2020    LABALBU 4.0 01/15/2020    BILITOT 0.30 01/15/2020    ALKPHOS 103 01/15/2020    AST 26 01/15/2020    ALT 41 01/15/2020         Lab Results   Component Value Date    RBC 4.27 (L) 01/16/2020    HGB 13.6 01/16/2020    MCV 94.4 01/16/2020    MCH 31.8 01/16/2020    MCHC 33.7 01/16/2020    RDW 13.5 01/16/2020    MPV 7.1 01/16/2020    BASOPCT 1 09/07/2019    LYMPHSABS 2.80 09/07/2019    MONOSABS 0.70 09/07/2019    NEUTROABS 6.70 09/07/2019    EOSABS 0.70 (H) 09/07/2019    BASOSABS 0.10 09/07/2019         DIAGNOSTIC TESTING:     No results found. IMPRESSION: Mr. Celestina Marte is a 39 y.o. male with    Diagnosis Orders   1. Rectal bleeding  COLONOSCOPY W/ OR W/O BIOPSY   2. Eosinophilic esophagitis  EGD   3. Pharyngoesophageal dysphagia  EGD   4. Nausea  EGD     Patient has history of EoE. He has stopped taking his Budesonide as ordered. He now reports nausea, epigastric pain after meals. Will arrange EGD. May need RUQ US as well. Patient has had intermittent rectal bleeding  Advised sitz baths, high fiber diet, avoid constipation and straining. Last colonoscopy 5 years ago. Will arrange colonoscopy to evaluate sigmoid/rectal pathology. The Endoscopic procedure was explained to the patient in detail  The prep and NPO were explained  All the Risks, Benefits, and Alternatives were explained  Risk of Bleeding, Perforation and Cardio Respiratory risks were explained  his questions were answered  The procedure has been scheduled with the  in the office  Patient was asked to give us a call for any questions  The patient has verbalized understanding and agreement to this plan. The patient was counseled at length about the risks of brianna Covid-19 during their perioperative period and any recovery window from their procedure. The patient was made aware that brianna Covid-19  may worsen their prognosis for recovering from their procedure  and lend to a higher morbidity and/or mortality risk. All material risks, benefits, and reasonable alternatives including postponing the procedure were discussed. The patient does wish to proceed with the procedure at this time. Thank you for allowing me to participate in the care of Mr. Larry Geiger. For any further questions please do not hesitate to contact me. I have reviewed and agree with the ROS entered by the MA/NIKN.          NATHAN Chauhan    [facility]  Office #: [phone]

## 2021-11-12 ENCOUNTER — HOSPITAL ENCOUNTER (OUTPATIENT)
Dept: PREADMISSION TESTING | Age: 41
Discharge: HOME | End: 2021-11-16
Payer: COMMERCIAL

## 2021-11-12 VITALS — HEIGHT: 75 IN | BODY MASS INDEX: 32.95 KG/M2 | WEIGHT: 265 LBS

## 2021-11-12 NOTE — PROGRESS NOTES
Pre-op Instructions For Out-Patient Surgery    Medication Instructions:  · Please stop herbs and any supplements now (includes vitamins and minerals). · Please contact your surgeon and prescribing physician for pre-op instructions for any blood thinners. Advil    · If you have inhalers/aerosol treatments at home, please use them the morning of your surgery and bring the inhalers with you to the hospital.    · Please take the following medications the morning of your surgery with a sip of water:    Losartan    Surgery Instructions:  1. After midnight before surgery:  Do not eat or drink anything, including water, mints, gum, and hard candy. You may brush your teeth without swallowing. No smoking, chewing tobacco, or street drugs. 2. Please shower or bathe before surgery. 3. Please do not wear any cologne, lotion, powder, deodorant, jewelry, piercings, perfume, makeup, nail polish, hair accessories, or hair spray on the day of surgery. Wear loose comfortable clothing. 4. Leave your valuables at home. Bring a storage case for any glasses/contacts. 5. An adult who is responsible for you MUST drive you home and should be with you for the first 24 hours after surgery. 6. If having out-patient knee and foot surgeries, please arrange for planned crutches, walker, or wheelchair before arriving to the hospital.    The Day of Surgery:  · Arrive at [facility] Surgery Entrance at the time directed by your surgeon and check in at the desk. · If you have a living will or healthcare power of , please bring a copy. · You will be taken to the pre-op holding area where you will be prepared for surgery. A physical assessment will be performed by a nurse practitioner or house officer.   Your IV will be started and you will meet your anesthesiologist.    · When you go to surgery, your family will be directed to the surgical waiting room, where the doctor should speak with them after your surgery. · After surgery, you will be taken to the recovery room then when you are awake and stable you will go to the short stay unit for preparation to be discharged. Only your one designated person is allowed to come to short stay for your discharge. · If you use a Bi-PAP or C-PAP machine, please bring it with you and leave it in the car in case it is needed in recovery room. Instructions read to Trinh Cocks and understanding verbalized.

## 2021-11-22 ENCOUNTER — ANESTHESIA EVENT (OUTPATIENT)
Dept: ENDOSCOPY | Age: 41
End: 2021-11-22
Payer: COMMERCIAL

## 2021-11-23 ENCOUNTER — HOSPITAL ENCOUNTER (OUTPATIENT)
Age: 41
Setting detail: OUTPATIENT SURGERY
Discharge: HOME | End: 2021-11-23
Attending: INTERNAL MEDICINE | Admitting: INTERNAL MEDICINE
Payer: COMMERCIAL

## 2021-11-23 ENCOUNTER — ANESTHESIA (OUTPATIENT)
Dept: ENDOSCOPY | Age: 41
End: 2021-11-23
Payer: COMMERCIAL

## 2021-11-23 VITALS
SYSTOLIC BLOOD PRESSURE: 144 MMHG | HEIGHT: 75 IN | HEART RATE: 81 BPM | DIASTOLIC BLOOD PRESSURE: 110 MMHG | WEIGHT: 260 LBS | TEMPERATURE: 97.8 F | BODY MASS INDEX: 32.33 KG/M2 | OXYGEN SATURATION: 97 % | RESPIRATION RATE: 18 BRPM

## 2021-11-23 VITALS — DIASTOLIC BLOOD PRESSURE: 93 MMHG | SYSTOLIC BLOOD PRESSURE: 115 MMHG | OXYGEN SATURATION: 95 % | TEMPERATURE: 98.2 F

## 2021-11-23 PROCEDURE — 88305 TISSUE EXAM BY PATHOLOGIST: CPT

## 2021-11-23 PROCEDURE — 3700000001 HC ADD 15 MINUTES (ANESTHESIA): Performed by: INTERNAL MEDICINE

## 2021-11-23 PROCEDURE — 3609010600 HC COLONOSCOPY POLYPECTOMY SNARE/COLD BIOPSY: Performed by: INTERNAL MEDICINE

## 2021-11-23 PROCEDURE — 3700000000 HC ANESTHESIA ATTENDED CARE: Performed by: INTERNAL MEDICINE

## 2021-11-23 PROCEDURE — 2709999900 HC NON-CHARGEABLE SUPPLY: Performed by: INTERNAL MEDICINE

## 2021-11-23 PROCEDURE — 43239 EGD BIOPSY SINGLE/MULTIPLE: CPT | Performed by: INTERNAL MEDICINE

## 2021-11-23 PROCEDURE — 6360000002 HC RX W HCPCS: Performed by: NURSE ANESTHETIST, CERTIFIED REGISTERED

## 2021-11-23 PROCEDURE — 45380 COLONOSCOPY AND BIOPSY: CPT | Performed by: INTERNAL MEDICINE

## 2021-11-23 PROCEDURE — 7100000000 HC PACU RECOVERY - FIRST 15 MIN: Performed by: INTERNAL MEDICINE

## 2021-11-23 PROCEDURE — 2500000003 HC RX 250 WO HCPCS: Performed by: NURSE ANESTHETIST, CERTIFIED REGISTERED

## 2021-11-23 PROCEDURE — 2580000003 HC RX 258: Performed by: ANESTHESIOLOGY

## 2021-11-23 PROCEDURE — 2580000003 HC RX 258: Performed by: NURSE ANESTHETIST, CERTIFIED REGISTERED

## 2021-11-23 PROCEDURE — 3609012400 HC EGD TRANSORAL BIOPSY SINGLE/MULTIPLE: Performed by: INTERNAL MEDICINE

## 2021-11-23 PROCEDURE — 45385 COLONOSCOPY W/LESION REMOVAL: CPT | Performed by: INTERNAL MEDICINE

## 2021-11-23 PROCEDURE — 7100000001 HC PACU RECOVERY - ADDTL 15 MIN: Performed by: INTERNAL MEDICINE

## 2021-11-23 RX ORDER — OXYCODONE HYDROCHLORIDE AND ACETAMINOPHEN 5; 325 MG/1; MG/1
2 TABLET ORAL PRN
Status: DISCONTINUED | OUTPATIENT
Start: 2021-11-23 | End: 2021-11-23 | Stop reason: HOSPADM

## 2021-11-23 RX ORDER — DIPHENHYDRAMINE HYDROCHLORIDE 50 MG/ML
12.5 INJECTION INTRAMUSCULAR; INTRAVENOUS
Status: DISCONTINUED | OUTPATIENT
Start: 2021-11-23 | End: 2021-11-23 | Stop reason: HOSPADM

## 2021-11-23 RX ORDER — SODIUM CHLORIDE, SODIUM LACTATE, POTASSIUM CHLORIDE, CALCIUM CHLORIDE 600; 310; 30; 20 MG/100ML; MG/100ML; MG/100ML; MG/100ML
INJECTION, SOLUTION INTRAVENOUS CONTINUOUS PRN
Status: DISCONTINUED | OUTPATIENT
Start: 2021-11-23 | End: 2021-11-23 | Stop reason: SDUPTHER

## 2021-11-23 RX ORDER — SODIUM CHLORIDE, SODIUM LACTATE, POTASSIUM CHLORIDE, CALCIUM CHLORIDE 600; 310; 30; 20 MG/100ML; MG/100ML; MG/100ML; MG/100ML
INJECTION, SOLUTION INTRAVENOUS CONTINUOUS
Status: DISCONTINUED | OUTPATIENT
Start: 2021-11-23 | End: 2021-11-23 | Stop reason: HOSPADM

## 2021-11-23 RX ORDER — PROPOFOL 10 MG/ML
INJECTION, EMULSION INTRAVENOUS CONTINUOUS PRN
Status: DISCONTINUED | OUTPATIENT
Start: 2021-11-23 | End: 2021-11-23 | Stop reason: SDUPTHER

## 2021-11-23 RX ORDER — LABETALOL HYDROCHLORIDE 5 MG/ML
5 INJECTION, SOLUTION INTRAVENOUS EVERY 10 MIN PRN
Status: DISCONTINUED | OUTPATIENT
Start: 2021-11-23 | End: 2021-11-23 | Stop reason: HOSPADM

## 2021-11-23 RX ORDER — METOPROLOL TARTRATE 5 MG/5ML
INJECTION INTRAVENOUS PRN
Status: DISCONTINUED | OUTPATIENT
Start: 2021-11-23 | End: 2021-11-23 | Stop reason: SDUPTHER

## 2021-11-23 RX ORDER — LIDOCAINE HYDROCHLORIDE 10 MG/ML
1 INJECTION, SOLUTION EPIDURAL; INFILTRATION; INTRACAUDAL; PERINEURAL
Status: DISCONTINUED | OUTPATIENT
Start: 2021-11-23 | End: 2021-11-23 | Stop reason: HOSPADM

## 2021-11-23 RX ORDER — ONDANSETRON 2 MG/ML
4 INJECTION INTRAMUSCULAR; INTRAVENOUS
Status: DISCONTINUED | OUTPATIENT
Start: 2021-11-23 | End: 2021-11-23 | Stop reason: HOSPADM

## 2021-11-23 RX ORDER — OXYCODONE HYDROCHLORIDE AND ACETAMINOPHEN 5; 325 MG/1; MG/1
1 TABLET ORAL PRN
Status: DISCONTINUED | OUTPATIENT
Start: 2021-11-23 | End: 2021-11-23 | Stop reason: HOSPADM

## 2021-11-23 RX ORDER — LIDOCAINE HYDROCHLORIDE 20 MG/ML
INJECTION, SOLUTION EPIDURAL; INFILTRATION; INTRACAUDAL; PERINEURAL PRN
Status: DISCONTINUED | OUTPATIENT
Start: 2021-11-23 | End: 2021-11-23 | Stop reason: SDUPTHER

## 2021-11-23 RX ADMIN — METOROPROLOL TARTRATE 2 MG: 5 INJECTION, SOLUTION INTRAVENOUS at 07:43

## 2021-11-23 RX ADMIN — LIDOCAINE HYDROCHLORIDE 80 MG: 20 INJECTION, SOLUTION EPIDURAL; INFILTRATION; INTRACAUDAL; PERINEURAL at 07:36

## 2021-11-23 RX ADMIN — SODIUM CHLORIDE, POTASSIUM CHLORIDE, SODIUM LACTATE AND CALCIUM CHLORIDE: 600; 310; 30; 20 INJECTION, SOLUTION INTRAVENOUS at 07:02

## 2021-11-23 RX ADMIN — SODIUM CHLORIDE, POTASSIUM CHLORIDE, SODIUM LACTATE AND CALCIUM CHLORIDE: 600; 310; 30; 20 INJECTION, SOLUTION INTRAVENOUS at 07:33

## 2021-11-23 RX ADMIN — PROPOFOL 250 MCG/KG/MIN: 10 INJECTION, EMULSION INTRAVENOUS at 07:36

## 2021-11-23 ASSESSMENT — PULMONARY FUNCTION TESTS
PIF_VALUE: 1
PIF_VALUE: 7
PIF_VALUE: 1
PIF_VALUE: 8
PIF_VALUE: 1

## 2021-11-23 ASSESSMENT — ENCOUNTER SYMPTOMS
RESPIRATORY NEGATIVE: 1
ABDOMINAL PAIN: 1
VOMITING: 0
RECTAL PAIN: 0
BLOOD IN STOOL: 1
EYE REDNESS: 1
ANAL BLEEDING: 1
BACK PAIN: 1
NAUSEA: 1

## 2021-11-23 ASSESSMENT — PAIN DESCRIPTION - DESCRIPTORS: DESCRIPTORS: BURNING

## 2021-11-23 ASSESSMENT — PAIN - FUNCTIONAL ASSESSMENT: PAIN_FUNCTIONAL_ASSESSMENT: 0-10

## 2021-11-23 NOTE — H&P
HISTORY and Annmarie Ghosh [id number]       NAME:  Karina Melton  MRN: [id number]   YOB: 1980   Date: 11/23/2021   Age: 39 y.o. Gender: male       COMPLAINT AND PRESENT HISTORY:   Karina Melton is 39 y.o.,  male, will be having a Colonoscopy and EGD. Prior Colonoscopy  was done  2017. Prior EGD was done 2020. Pre diagnosis: EOSINOPHILIC ESOPHAGITIS PHARYNGOESOPHAGEAL DYSPHAGIA,  NAUSEA,  RECTAL BLEEDING     HPI:  Patient has no hx of Colon Polyps or  Diverticulosis. Patient denies any  FH of Colon. Cancer. Patient has positive FH of esophageal Cancer in grand mother. Patient reports no changes in bowel habits. No constipation or diarrhea. Pt states he has rectal bleeding moderate amount with bowel movements three weeks ago for two days and stopped ,  currently he has just spotting with  normal bowel movements. Patient has a history of cramping /dull abd. pain in the RUQ area, with out any radiating. pain rated  4/10 on 0-10 scale . Pain is worse after eating with nausea / abdominal bloating, but no vomiting. Pt reports dysphagia and difficulty with any kind of food food getting stuck. Pt has to resolve to drinking more fluids to assist with swallowing. Pt has hx of GERD , Pt is on  Prilosec , that is helping to control symptoms. Pt denies fever/chills, chest pain or SOB.      Review of additional significant medical hx:  Hyperlipidemia   Medication r/t condition : Lipitor   HTN  Medication r/t condition : Cozaar  Sleep apnea  ( pt do no not wear CPAP mask at night)     BP Readings from Last 3 Encounters:   11/11/21 (!) 140/102   09/11/20 (!) 152/92   09/11/20 (!) 157/111     Pulse Readings from Last 3 Encounters:   11/11/21 95   09/11/20 101   06/09/20 115         Lab Results   Component Value Date    WBC 11.0 01/16/2020    HGB 13.6 01/16/2020    HCT 40.3 (L) 01/16/2020    MCV 94.4 01/16/2020     01/16/2020     Lab Results   Component Value Date     01/16/2020 K 4.0 01/16/2020     01/16/2020    CO2 24 01/16/2020    BUN 7 01/16/2020    CREATININE 1.03 01/16/2020    GLUCOSE 112 01/16/2020    CALCIUM 8.2 01/16/2020        NPO status: pt NPO since the past midnight  Medications taken TODAY (with sip of water): pt took losartan today with sip of water  Anticoagulation status: none   Prep fully completed: YES. Pt reports his BM id clear liquid   Denies personal hx of blood clots. Denies personal hx of MRSA infection. Denies any personal or family hx of previous complications w/anesthesia. PAST MEDICAL HISTORY     Past Medical History:   Diagnosis Date    History of kidney stones     Hyperlipidemia     Hypertension     Sleep apnea     Do not wear CPAP mask       SURGICAL HISTORY       Past Surgical History:   Procedure Laterality Date    COLONOSCOPY  12/08/2017    No lesions seen up to the cecum and a couple of inches of ileum. Random biopsies taken. , pathology--LARGE INTESTINAL TYPE MUCOSA DEMONSTRATING NO SIGNIFICANT     CYSTO/URETERO/PYELOSCOPY, CALCULUS TX Right 1/24/2020    HOLMIUM LASER LITHO, CYSTO, RIGHT URETEROSCOPY, RIGHT STENT EXCHANGE, STONE BASKETING performed by Clyde Melgoza MD at [address] Right 1/16/2020    CYSTOSCOPY URETERAL STENT INSERTION performed by Clyde Melgoza MD at [address] [facility] Right 01/24/2020    HOLMIUM LASER LITHO, CYSTO, RIGHT URETEROSCOPY, RIGHT STENT EXCHANGE, STONE BASKETING    ENDOSCOPY, COLON, DIAGNOSTIC      HERNIA REPAIR Left 07/27/2016    Dr Clare Mcrae N/A 6/9/2020    HERNIA VENTRAL REPAIR LAPAROSCOPIC ROBOTIC XI W/MESH performed by Timbo Heart MD at [address] [facility] W/BIOPSY SINGLE/MULTIPLE N/A 12/8/2017    COLONOSCOPY WITH BIOPSY performed by Ericka Biswas MD at [address] [facility] EGD TRANSORAL BIOPSY SINGLE/MULTIPLE N/A 12/8/2017    EGD BIOPSY performed by Ericka Biswas MD at [address] [facility]  ENDOSCOPY  05/26/2009    pathology-focal active esophagitis, showing prominent eosinophilia-up to 50 esonophils per high power field    UPPER GASTROINTESTINAL ENDOSCOPY  12/08/2017    Probable eosinophilic esophagitis no stricture seen, pathology-SQUAMOUS EPITHELIUM WITH EOSINOPHILIA (SEE COMMENT)     UPPER GASTROINTESTINAL ENDOSCOPY N/A 7/6/2018    EGD BIOPSY performed by Era Booth MD at [address] [facility] N/A 7/19/2019    EGD BIOPSY performed by Era Booth MD at [place] [address] N/A 9/11/2020    EGD BIOPSY performed by Era Booth MD at [address] [place] [address]       Family History   Problem Relation Age of Onset    Cancer Mother     High Cholesterol Father     High Cholesterol Brother        SOCIAL HISTORY       Social History     Socioeconomic History    Marital status:      Spouse name: Not on file    Number of children: 2    Years of education: Not on file    Highest education level: Not on file   Occupational History    Not on file   Tobacco Use    Smoking status: Never Smoker    Smokeless tobacco: Former User     Types: Chew   Vaping Use    Vaping Use: Never used   Substance and Sexual Activity    Alcohol use: Yes     Alcohol/week: 2.0 standard drinks     Types: 2 Shots of liquor per week     Comment: once a week    Drug use: No    Sexual activity: Not on file   Other Topics Concern    Not on file   Social History Narrative    Not on file     Social Determinants of Health     Financial Resource Strain:     Difficulty of Paying Living Expenses: Not on file   Food Insecurity:     Worried About Running Out of Food in the Last Year: Not on file    Christina of Food in the Last Year: Not on file   Transportation Needs:     Lack of Transportation (Medical): Not on file    Lack of Transportation (Non-Medical):  Not on file   Physical Activity:     Days of Exercise per Week: Not on file    Minutes of Exercise per Session: Not on file   Stress:     Feeling of Stress : Not on file   Social Connections:     Frequency of Communication with Friends and Family: Not on file    Frequency of Social Gatherings with Friends and Family: Not on file    Attends Judaism Services: Not on file    Active Member of [address] [place] [address] or Organizations: Not on file    Attends Club or Organization Meetings: Not on file    Marital Status: Not on file   Intimate Partner Violence:     Fear of Current or Ex-Partner: Not on file    Emotionally Abused: Not on file    Physically Abused: Not on file    Sexually Abused: Not on file   Housing Stability:     Unable to Pay for Housing in the Last Year: Not on file    Number of [place] in the Last Year: Not on file    Unstable Housing in the Last Year: Not on file           REVIEW OF SYSTEMS      Allergies   Allergen Reactions    Chicken Allergy      Other reaction(s): Throat issue    Naproxen Nausea Only    Peanut-Containing Drug Products      Other reaction(s): Throat issue       No current facility-administered medications on file prior to encounter. Current Outpatient Medications on File Prior to Encounter   Medication Sig Dispense Refill    atorvastatin (LIPITOR) 10 MG tablet       traZODone (DESYREL) 50 MG tablet take 1 tablet by mouth once daily at bedtime if needed      polyethylene glycol (GLYCOLAX) 17 GM/SCOOP powder Use as directed by following your patient instructions given by office.  238 g 0    bisacodyl (DULCOLAX) 5 MG EC tablet TAKE 2 TABS AT 9 AM THE DAY PRIOR TO COLONOSCOPY 2 tablet 0    magnesium citrate solution Take 296 mLs by mouth once for 1 dose 296 mL 0    ibuprofen (ADVIL;MOTRIN) 800 MG tablet Take 1 tablet by mouth every 8 hours as needed for Pain 30 tablet 0    omeprazole (PRILOSEC) 40 MG delayed release capsule take 1 capsule by mouth once daily 30 capsule 3    losartan (COZAAR) 25 MG tablet Take 25 mg by mouth daily      buPROPion (WELLBUTRIN XL) 300 MG extended release tablet Take 1 tablet by mouth every morning 30 tablet 3    RA ALLERGY RELIEF 10 MG tablet 1 tablet daily  0       Review of Systems   Constitutional: Positive for fatigue. HENT: Negative. Eyes: Positive for redness. Respiratory: Negative. Cardiovascular: Positive for palpitations. Gastrointestinal: Positive for abdominal pain, anal bleeding, blood in stool and nausea. Negative for rectal pain and vomiting. Genitourinary: Negative. Musculoskeletal: Positive for back pain and neck pain. Neurological: Positive for dizziness, light-headedness and headaches. Hematological: Negative. Psychiatric/Behavioral: Negative. GENERAL PHYSICAL EXAM     Vitals: See nursing flow sheet for vital signs     GENERAL APPEARANCE:   Nannette Topete is 39 y.o.,  male, , nourished, conscious, alert. Does not appear to be distress or pain at this time. Physical Exam  Constitutional:       Appearance: Normal appearance. HENT:      Head: Normocephalic. Right Ear: External ear normal.      Left Ear: External ear normal.      Nose: Nose normal.      Mouth/Throat:      Mouth: Mucous membranes are moist.   Eyes:      General:         Right eye: No discharge. Left eye: No discharge. Pupils: Pupils are equal, round, and reactive to light. Cardiovascular:      Rate and Rhythm: Normal rate and regular rhythm. Pulses: Normal pulses. Radial pulses are 2+ on the right side and 2+ on the left side. Dorsalis pedis pulses are 2+ on the right side and 2+ on the left side. Posterior tibial pulses are 2+ on the right side and 2+ on the left side. Heart sounds: Normal heart sounds. No murmur heard. No friction rub. No gallop. Pulmonary:      Effort: Pulmonary effort is normal.      Breath sounds: Normal breath sounds. No wheezing, rhonchi or rales.    Abdominal:      General: Bowel sounds are normal. There is no distension. Palpations: Abdomen is soft. Tenderness: There is no abdominal tenderness. Musculoskeletal:         General: Normal range of motion. Cervical back: Normal range of motion and neck supple. Right lower leg: No edema. Left lower leg: No edema. Skin:     General: Skin is warm and dry. Findings: No bruising, erythema, lesion or rash. Neurological:      General: No focal deficit present. Mental Status: He is alert and oriented to person, place, and time.       Gait: Gait normal.   Psychiatric:         Mood and Affect: Mood normal.         Behavior: Behavior normal.                   PROVISIONAL DIAGNOSES / SURGERY:    EOSINOPHILIC ESOPHAGITIS PHARYNGOESOPHAGEAL DYSPHAGIA,  NAUSEA , RECTAL BLEEDING   COLONOSCOPY DIAGNOSTIC  EGD ESOPHAGOGASTRODUODENOSCOPY  Patient Active Problem List    Diagnosis Date Noted    Ventral hernia without obstruction or gangrene     Kidney stone 01/16/2020    Hypocalcemia 01/16/2020    Labile blood pressure     Hydroureteronephrosis 01/15/2020    Tiredness 70/67/7285    Eosinophilic esophagitis 68/03/7478    Pharyngoesophageal dysphagia 02/08/2018    Elevated ALT measurement 02/08/2018    Essential hypertension 01/18/2018    Loose stools 11/06/2017    Testicular pain, left 11/06/2017    Neck pain 01/06/2017    Rectal bleeding 12/13/2016    Shoulder pain, left 06/30/2016    Anxiety 03/03/2014           PATRICIA Luu - CNP on 11/23/2021 at 6:14 AM

## 2021-11-23 NOTE — OP NOTE
ESOPHAGOGASTRODUODENOSCOPY   ( EGD )  DATE OF PROCEDURE: 11/23/2021     SURGEON: Ora Dakin, MD    ASSISTANT: None    PREOPERATIVE DIAGNOSIS: Patient has GERD, dysphagia. In the past he was diagnosed to have eosinophilic esophagitis and was treated. Procedure performed to evaluate esophageal pathology, rule out stricture. POSTOPERATIVE DIAGNOSIS: No obvious strictures noted in the esophagus. Some signs of eosinophilic esophagitis including esophageal rings. OPERATION: Upper GI endoscopy with Biopsy    ANESTHESIA: MAC    ESTIMATED BLOOD LOSS: None    COMPLICATIONS: None. SPECIMENS:  Was Obtained: Random biopsies from the duodenum to evaluate possibility of eosinophilic enteritis    Biopsies taken from the esophagus to evaluate eosinophilic esophagitis    HISTORY: The patient is a 39y.o. year old male with history of above preop diagnosis. I recommended esophagogastroduodenoscopy with possible biopsy and I explained the risk, benefits, expected outcome, and alternatives to the procedure. Risks included but are not limited to bleeding, infection, respiratory distress, hypotension, and perforation of the esophagus, stomach, or duodenum. Patient understands and is in agreement. PROCEDURE: The patient was given IV conscious sedation. The patient's SPO2 remained above 90% throughout the procedure. Cetacaine spray given. Patient placed in left lateral position. Olympus  videogastroscope was inserted orally under vision into the esophagus without difficulty and advanced into the stomach then through the pylorus up to the second part of duodenum. Findings:    Retropharyngeal area was grossly normal appearing    Esophagus: abnormal: Has concentric  rings in the mid esophagus. No strictures seen. Squamocolumnar junction is at about 40 cm and no hiatal hernia. No Wilson's mucosa noted.   Multiple random biopsies taken from the mid body and upper body of the esophagus evaluate

## 2021-11-23 NOTE — OP NOTE
COLONOSCOPY    DATE OF PROCEDURE: 11/23/2021    SURGEON: Kelechi Fortune MD    ASSISTANT: None    PREOPERATIVE DIAGNOSIS: Bowel habit change. Procedure performed to evaluate colonic lesions    POSTOPERATIVE DIAGNOSIS: Polyp transverse colon. No colitis, ileitis seen. OPERATION: Total colonoscopy, snare polypectomy from transverse colon, random biopsies from the colon to evaluate microscopic colitis    ANESTHESIA: MAC    ESTIMATED BLOOD LOSS: None    COMPLICATIONS: None     SPECIMENS:  Was Obtained: Biopsy from the right, transverse, left colon evaluate microscopic colitis. Polyp transverse colon    HISTORY: The patient is a 39y.o. year old male with history of above preop diagnosis. I recommended colonoscopy with possible biopsy or polypectomy and I explained the risk, benefits, expected outcome, and alternatives to the procedure. Risks included but are not limited to bleeding, infection, respiratory distress, hypotension, and perforation of the colon and possibility of missing a lesion. The patient understands and is in agreement. PROCEDURE:  The patient's SPO2 remained above 90% throughout the procedure. Digital rectal exam was normal.  The colonoscope was inserted through the anus into the rectum and advanced under direct vision to the cecum without difficulty. Terminal ileum was examined for approximately 2 inches. The prep was adequate. Findings:  Terminal ileum: normal    Cecum/Ascending colon: normal, also examined in the retroflexed view    Transverse colon: abnormal: Polyp which is about 5 mm seen, completely removed using cold snare. Descending/Sigmoid colon: normal    Rectum/Anus: examined in normal and retroflexed positions and was normal    Withdrawal Time was (minutes): 12          The colon was decompressed and the scope was removed. The patient tolerated the procedure without unusual events.      During the procedure, the patient's blood pressure, pulse and oxygen saturation remained stable and documented. No unusual events occurred during the procedure. Patient was transferred to recovery room and will be discharged when criteria is met. Recommendations/Plan:   1. F/U Biopsies  2. F/U In Office as instructed  3. Discussed with the family  4. High fiber diet   5. Precautions to avoid constipation     Next colonoscopy: 5 years.   If Colonoscopy is less than 10 years the recommended reason is due:polyps    Electronically signed by Amber Moyer MD  on 11/23/2021 at 8:08 AM

## 2021-11-23 NOTE — ANESTHESIA POSTPROCEDURE EVALUATION
Department of Anesthesiology  Postprocedure Note    Patient: Felicitas Adkins  MRN: [id number]  YOB: 1980  Date of evaluation: 11/23/2021  Time:  2:20 PM     Procedure Summary     Date: 11/23/21 Room / Location: [address] [place] [address] ENDO 01 / [address] [facility] ENDO    Anesthesia Start: 6120 Anesthesia Stop: 0815    Procedures:       EGD BIOPSY (N/A Esophagus)      COLONOSCOPY POLYPECTOMY SNARE/COLD BIOPSY (N/A Anus) Diagnosis: (EOSINOPHILIC ESOPHAGITIS PHARYNGOESOPHAGEAL  DYSPHAGIA  NAUSEA RECTAL BLEEDING     (PT HAS HAD COVID VACCINE))    Surgeons: Saul Middleton MD Responsible Provider: Maral Grider MD    Anesthesia Type: general ASA Status: 3          Anesthesia Type: general    Kaia Phase I: Kaia Score: 8    Kaia Phase II:      Last vitals: Reviewed and per EMR flowsheets.        Anesthesia Post Evaluation    Comments: POST- ANESTHESIA EVALUATION       Pt Name: Felicitas Adkins  MRN: [id number]  YOB: 1980  Date of evaluation: 11/23/2021  Time:  2:20 PM      /99  Pulse 81   Temp 97.8 °F (36.6 °C)   Resp 18   Ht 6' 3\" (1.905 m)   Wt 260 lb (117.9 kg)   SpO2 97%   BMI 32.50 kg/m²      Consciousness Level  Awake  Cardiopulmonary Status  Stable  Pain Adequately Treated YES  Nausea / Vomiting  NO  Adequate Hydration  YES  Anesthesia Related Complications NONE      Electronically signed by Maral Grider MD on 11/23/2021 at 2:20 PM

## 2021-11-23 NOTE — ANESTHESIA PRE PROCEDURE
Department of Anesthesiology  Preprocedure Note       Name:  Vladimir Brewer   Age:  39 y.o.  :  1980                                          MRN:  [id number]         Date:  2021      Surgeon: Leobardo Niño):  Sunny Tejada MD    Procedure: Procedure(s):  EGD ESOPHAGOGASTRODUODENOSCOPY  COLONOSCOPY DIAGNOSTIC    Medications prior to admission:   Prior to Admission medications    Medication Sig Start Date End Date Taking? Authorizing Provider   atorvastatin (LIPITOR) 10 MG tablet  21   Historical Provider, MD   traZODone (DESYREL) 50 MG tablet take 1 tablet by mouth once daily at bedtime if needed 10/21/21   Historical Provider, MD   polyethylene glycol (GLYCOLAX) 17 GM/SCOOP powder Use as directed by following your patient instructions given by office. 21   Gio Kumar MD   bisacodyl (DULCOLAX) 5 MG EC tablet TAKE 2 TABS AT 9 AM THE DAY PRIOR TO COLONOSCOPY 21   Silvia Kumar MD   magnesium citrate solution Take 296 mLs by mouth once for 1 dose 21  Sunny Tejada MD   ibuprofen (ADVIL;MOTRIN) 800 MG tablet Take 1 tablet by mouth every 8 hours as needed for Pain 1/15/20   Shonto Cover, DO   omeprazole (PRILOSEC) 40 MG delayed release capsule take 1 capsule by mouth once daily 10/29/19   PATRICIA Ambrose - NP   losartan (COZAAR) 25 MG tablet Take 25 mg by mouth daily    Historical Provider, MD   buPROPion (WELLBUTRIN XL) 300 MG extended release tablet Take 1 tablet by mouth every morning 18   Christiane Parish MD   RA ALLERGY RELIEF 10 MG tablet 1 tablet daily 18   Historical Provider, MD       Current medications:    No current facility-administered medications for this visit. No current outpatient medications on file.      Facility-Administered Medications Ordered in Other Visits   Medication Dose Route Frequency Provider Last Rate Last Admin    lactated ringers infusion   IntraVENous Continuous Vito Bernheim, MD        lidocaine 12/8/2017    COLONOSCOPY WITH BIOPSY performed by Shweta Alvarado MD at [address] EGD TRANSORAL BIOPSY SINGLE/MULTIPLE N/A 12/8/2017    EGD BIOPSY performed by Shweta Alvarado MD at [facility]  05/26/2009    pathology-focal active esophagitis, showing prominent eosinophilia-up to 50 esonophils per high power field    UPPER GASTROINTESTINAL ENDOSCOPY  12/08/2017    Probable eosinophilic esophagitis no stricture seen, pathology-SQUAMOUS EPITHELIUM WITH EOSINOPHILIA (SEE COMMENT)     UPPER GASTROINTESTINAL ENDOSCOPY N/A 7/6/2018    EGD BIOPSY performed by Shweta Alvarado MD at [address] 7/19/2019    EGD BIOPSY performed by Shweta Alvarado MD at [address] N/A 9/11/2020    EGD BIOPSY performed by Shweta Alvarado MD at [address] [place] [address] History:    Social History     Tobacco Use    Smoking status: Never Smoker    Smokeless tobacco: Former User     Types: Chew   Substance Use Topics    Alcohol use: Yes     Alcohol/week: 2.0 standard drinks     Types: 2 Shots of liquor per week     Comment: once a week                                Counseling given: Not Answered      Vital Signs (Current): There were no vitals filed for this visit.                                            BP Readings from Last 3 Encounters:   11/11/21 (!) 140/102   09/11/20 (!) 152/92   09/11/20 (!) 157/111       NPO Status:                                                                                 BMI:   Wt Readings from Last 3 Encounters:   11/12/21 265 lb (120.2 kg)   11/11/21 271 lb 1.6 oz (123 kg)   09/25/20 268 lb (121.6 kg)     There is no height or weight on file to calculate BMI.    CBC:   Lab Results   Component Value Date    WBC 11.0 01/16/2020    RBC 4.27 01/16/2020    HGB 13.6 01/16/2020    HCT 40.3 01/16/2020    MCV 94.4 01/16/2020    RDW 13.5 01/16/2020     01/16/2020       CMP:   Lab Results   Component Value Date     01/16/2020    K 4.0 01/16/2020     01/16/2020    CO2 24 01/16/2020    BUN 7 01/16/2020    CREATININE 1.03 01/16/2020    GFRAA >60 01/16/2020    LABGLOM >60 01/16/2020    GLUCOSE 112 01/16/2020    PROT 7.2 01/15/2020    CALCIUM 8.2 01/16/2020    BILITOT 0.30 01/15/2020    ALKPHOS 103 01/15/2020    AST 26 01/15/2020    ALT 41 01/15/2020       POC Tests: No results for input(s): POCGLU, POCNA, POCK, POCCL, POCBUN, POCHEMO, POCHCT in the last 72 hours. Coags: No results found for: PROTIME, INR, APTT    HCG (If Applicable): No results found for: PREGTESTUR, PREGSERUM, HCG, HCGQUANT     ABGs: No results found for: PHART, PO2ART, KXX1UZE, XTT5KMZ, BEART, H6OYOBUE     Type & Screen (If Applicable):  No results found for: LABABO, LABRH    Drug/Infectious Status (If Applicable):  No results found for: HIV, HEPCAB    COVID-19 Screening (If Applicable):   Lab Results   Component Value Date    COVID19 Not Detected 09/08/2020         Anesthesia Evaluation  Patient summary reviewed and Nursing notes reviewed no history of anesthetic complications (states he was waking up when the scope was still being taken out during last EGD): Airway: Mallampati: III  TM distance: >3 FB   Neck ROM: full  Mouth opening: > = 3 FB Dental: normal exam         Pulmonary:normal exam  breath sounds clear to auscultation  (+) sleep apnea: on CPAP,                             Cardiovascular:    (+) hypertension:,       ECG reviewed  Rhythm: regular  Rate: normal                    Neuro/Psych:   Negative Neuro/Psych ROS              GI/Hepatic/Renal:   (+) GERD:, renal disease: kidney stones,          ROS comment:  Eosinophilic esophagitis   Pharyngoesophageal dysphagia. Endo/Other: Negative Endo/Other ROS                    Abdominal:             Vascular: negative vascular ROS.          Other Findings:             Anesthesia Plan      general     ASA 3       Induction: intravenous. MIPS: Prophylactic antiemetics administered. Anesthetic plan and risks discussed with patient. Plan discussed with CRNA.                   Wilver Li MD   11/23/2021

## 2021-11-24 LAB — SURGICAL PATHOLOGY REPORT: NORMAL

## 2021-12-08 ENCOUNTER — OFFICE VISIT (OUTPATIENT)
Dept: GASTROENTEROLOGY | Age: 41
End: 2021-12-08
Payer: COMMERCIAL

## 2021-12-08 VITALS
WEIGHT: 273 LBS | DIASTOLIC BLOOD PRESSURE: 96 MMHG | BODY MASS INDEX: 34.12 KG/M2 | SYSTOLIC BLOOD PRESSURE: 140 MMHG | HEART RATE: 92 BPM

## 2021-12-08 DIAGNOSIS — K20.0 EOSINOPHILIC ESOPHAGITIS: Primary | ICD-10-CM

## 2021-12-08 PROCEDURE — 99214 OFFICE O/P EST MOD 30 MIN: CPT | Performed by: INTERNAL MEDICINE

## 2021-12-08 PROCEDURE — APPSS30 APP SPLIT SHARED TIME 16-30 MINUTES: Performed by: NURSE PRACTITIONER

## 2021-12-08 RX ORDER — FLUTICASONE PROPIONATE 220 UG/1
AEROSOL, METERED RESPIRATORY (INHALATION)
Qty: 1 EACH | Refills: 3 | Status: SHIPPED | OUTPATIENT
Start: 2021-12-08

## 2021-12-08 ASSESSMENT — ENCOUNTER SYMPTOMS
RESPIRATORY NEGATIVE: 1
GASTROINTESTINAL NEGATIVE: 1
ALLERGIC/IMMUNOLOGIC NEGATIVE: 1

## 2021-12-08 NOTE — PROGRESS NOTES
GI OFFICE FOLLOW UP    INTERVAL HISTORY:   No referring provider defined for this encounter. HISTORY OF PRESENT ILLNESS:     Patient being seen for follow-up EGD. Patient has known history of eosinophilic esophagitis. He was taking fluticasone and PPI therapy however it appears he stopped fluticasone therapy approximately 4 to 5 months ago. In September 2020 patient had greater than 100 eosinophils per high-power field. On repeat EGD no obvious strictures were noted in the esophagus. Does have some signs of eosinophilic esophagitis including concentric rings in the midesophagus. Biopsies revealed normal squamous mucosa with less than 1 eosinophil per high-powered field. At present patient denies any dysphagia, odynophagia. No significant heartburns. Has good appetite. There is no weight loss. Patient also had screening colonoscopy. Noted to have small 5 mm polyp in the transverse colon. This was a tubular adenoma. At present bowel movements are satisfactory. No significant constipation or diarrhea. Denies any melena, hematochezia. No abdominal pain, cramping, bloating. Past Medical,Family, and Social History reviewed and does contribute to the patient presenting condition. Patient's PMH/PSH,SH,PSYCH Hx, MEDs, ALLERGIES, and ROS were all reviewed and updated in the appropriate sections. Yes      PAST MEDICAL HISTORY:  Past Medical History:   Diagnosis Date    History of kidney stones     Hyperlipidemia     Hypertension     Sleep apnea     Do not wear CPAP mask       Past Surgical History:   Procedure Laterality Date    COLONOSCOPY  12/08/2017    No lesions seen up to the cecum and a couple of inches of ileum. Random biopsies taken. , pathology--LARGE INTESTINAL TYPE MUCOSA DEMONSTRATING NO SIGNIFICANT     COLONOSCOPY N/A 11/23/2021    COLONOSCOPY POLYPECTOMY SNARE/COLD BIOPSY performed by Yudelka Jones MD at [address] [facility], [place] Right 1/24/2020    HOLMIUM LASER LITHO, CYSTO, RIGHT URETEROSCOPY, RIGHT STENT EXCHANGE, STONE BASKETING performed by Stone Worrell MD at [address] [place] Right 1/16/2020    CYSTOSCOPY URETERAL STENT INSERTION performed by Stone Worrell MD at [address] [facility] Right 01/24/2020    HOLMIUM LASER LITHO, CYSTO, RIGHT URETEROSCOPY, RIGHT STENT EXCHANGE, STONE BASKETING    ENDOSCOPY, COLON, DIAGNOSTIC      HERNIA REPAIR Left 07/27/2016    Dr Bernard Lafleur N/A 6/9/2020    HERNIA VENTRAL REPAIR LAPAROSCOPIC ROBOTIC XI W/MESH performed by Chetna Navarro MD at [address] [facility] W/BIOPSY SINGLE/MULTIPLE N/A 12/8/2017    COLONOSCOPY WITH BIOPSY performed by Yudelka Jones MD at [address] [place] EGD TRANSORAL BIOPSY SINGLE/MULTIPLE N/A 12/8/2017    EGD BIOPSY performed by Yudelka Jones MD at [facility]  05/26/2009    pathology-focal active esophagitis, showing prominent eosinophilia-up to 50 esonophils per high power field    UPPER GASTROINTESTINAL ENDOSCOPY  12/08/2017    Probable eosinophilic esophagitis no stricture seen, pathology-SQUAMOUS EPITHELIUM WITH EOSINOPHILIA (SEE COMMENT)     UPPER GASTROINTESTINAL ENDOSCOPY N/A 7/6/2018    EGD BIOPSY performed by Yudelka Jones MD at [address] [facility] 7/19/2019    EGD BIOPSY performed by Yudelka Jones MD at [address] [facility] 9/11/2020    EGD BIOPSY performed by Yudelka Jones MD at [address] [facility] N/A 11/23/2021    EGD BIOPSY performed by Yudelka Jones MD at [address] [facility]:    Current Outpatient Medications:     fluticasone (FLOVENT HFA) 220 MCG/ACT inhaler, 2 squirts daily to the back of throat and swallow, do not inhale. Once daily. No food or fluids for 30 minutes after., Disp: 1 each, Rfl: 3    atorvastatin (LIPITOR) 10 MG tablet, , Disp: , Rfl:     traZODone (DESYREL) 50 MG tablet, take 1 tablet by mouth once daily at bedtime if needed, Disp: , Rfl:     polyethylene glycol (GLYCOLAX) 17 GM/SCOOP powder, Use as directed by following your patient instructions given by office. , Disp: 238 g, Rfl: 0    bisacodyl (DULCOLAX) 5 MG EC tablet, TAKE 2 TABS AT 9 AM THE DAY PRIOR TO COLONOSCOPY, Disp: 2 tablet, Rfl: 0    ibuprofen (ADVIL;MOTRIN) 800 MG tablet, Take 1 tablet by mouth every 8 hours as needed for Pain, Disp: 30 tablet, Rfl: 0    omeprazole (PRILOSEC) 40 MG delayed release capsule, take 1 capsule by mouth once daily, Disp: 30 capsule, Rfl: 3    losartan (COZAAR) 25 MG tablet, Take 25 mg by mouth daily, Disp: , Rfl:     buPROPion (WELLBUTRIN XL) 300 MG extended release tablet, Take 1 tablet by mouth every morning, Disp: 30 tablet, Rfl: 3    RA ALLERGY RELIEF 10 MG tablet, 1 tablet daily, Disp: , Rfl: 0    magnesium citrate solution, Take 296 mLs by mouth once for 1 dose, Disp: 296 mL, Rfl: 0    ALLERGIES:   Allergies   Allergen Reactions    Chicken Allergy      Other reaction(s): Throat issue    Naproxen Nausea Only    Peanut-Containing Drug Products      Other reaction(s): Throat issue       FAMILY HISTORY:       Problem Relation Age of Onset    Cancer Mother     High Cholesterol Father     High Cholesterol Brother          SOCIAL HISTORY:   Social History     Socioeconomic History    Marital status:      Spouse name: Not on file    Number of children: 2    Years of education: Not on file    Highest education level: Not on file   Occupational History    Not on file   Tobacco Use    Smoking status: Never Smoker    Smokeless tobacco: Former User     Types: Chew   Vaping Use    Vaping Use: Never used   Substance and Sexual Activity    Alcohol use:  Yes     Alcohol/week: 2.0 standard drinks     Types: 2 Shots of liquor per week     Comment: once a week    Drug use: No    Sexual activity: Not on file   Other Topics Concern    Not on file   Social History Narrative    Not on file     Social Determinants of Health     Financial Resource Strain:     Difficulty of Paying Living Expenses: Not on file   Food Insecurity:     Worried About Running Out of Food in the Last Year: Not on file    Christina of Food in the Last Year: Not on file   Transportation Needs:     Lack of Transportation (Medical): Not on file    Lack of Transportation (Non-Medical): Not on file   Physical Activity:     Days of Exercise per Week: Not on file    Minutes of Exercise per Session: Not on file   Stress:     Feeling of Stress : Not on file   Social Connections:     Frequency of Communication with Friends and Family: Not on file    Frequency of Social Gatherings with Friends and Family: Not on file    Attends Congregational Services: Not on file    Active Member of [address] [place] [address] or Organizations: Not on file    Attends Club or Organization Meetings: Not on file    Marital Status: Not on file   Intimate Partner Violence:     Fear of Current or Ex-Partner: Not on file    Emotionally Abused: Not on file    Physically Abused: Not on file    Sexually Abused: Not on file   Housing Stability:     Unable to Pay for Housing in the Last Year: Not on file    Number of [place] in the Last Year: Not on file    Unstable Housing in the Last Year: Not on file         REVIEW OF SYSTEMS:         Review of Systems   Constitutional: Negative. HENT: Negative. Eyes: Positive for visual disturbance (glasses ). Respiratory: Negative. Cardiovascular: Negative. Gastrointestinal: Negative. Endocrine: Negative. Genitourinary: Negative. Musculoskeletal: Negative. Skin: Negative. Allergic/Immunologic: Negative. Neurological: Negative. Hematological: Negative. Psychiatric/Behavioral: Negative. PHYSICAL EXAMINATION:     Vital signs reviewed per the nursing documentation. BP (!) 140/96   Pulse 92   Wt 273 lb (123.8 kg)   BMI 34.12 kg/m²   Body mass index is 34.12 kg/m². Physical Exam  Constitutional:       Appearance: Normal appearance. He is well-groomed and overweight. Eyes:      General: No scleral icterus. Pupils: Pupils are equal, round, and reactive to light. Cardiovascular:      Rate and Rhythm: Normal rate and regular rhythm. Heart sounds: Normal heart sounds. Pulmonary:      Effort: Pulmonary effort is normal.      Breath sounds: Normal breath sounds. Abdominal:      General: Bowel sounds are normal. There is no distension. Palpations: Abdomen is soft. There is no mass. Tenderness: There is no abdominal tenderness. There is no guarding. Skin:     General: Skin is warm and dry. Coloration: Skin is not jaundiced. Neurological:      Mental Status: He is alert and oriented to person, place, and time. Mental status is at baseline. LABORATORY DATA: Reviewed  Lab Results   Component Value Date    WBC 11.0 01/16/2020    HGB 13.6 01/16/2020    HCT 40.3 (L) 01/16/2020    MCV 94.4 01/16/2020     01/16/2020     01/16/2020    K 4.0 01/16/2020     01/16/2020    CO2 24 01/16/2020    BUN 7 01/16/2020    CREATININE 1.03 01/16/2020    LABALBU 4.0 01/15/2020    BILITOT 0.30 01/15/2020    ALKPHOS 103 01/15/2020    AST 26 01/15/2020    ALT 41 01/15/2020         Lab Results   Component Value Date    RBC 4.27 (L) 01/16/2020    HGB 13.6 01/16/2020    MCV 94.4 01/16/2020    MCH 31.8 01/16/2020    MCHC 33.7 01/16/2020    RDW 13.5 01/16/2020    MPV 7.1 01/16/2020    BASOPCT 1 09/07/2019    LYMPHSABS 2.80 09/07/2019    MONOSABS 0.70 09/07/2019    NEUTROABS 6.70 09/07/2019    EOSABS 0.70 (H) 09/07/2019    BASOSABS 0.10 09/07/2019         DIAGNOSTIC TESTING:     No results found. Assessment  1.  Eosinophilic esophagitis        Plan    EGD

## 2022-10-15 ENCOUNTER — HOSPITAL ENCOUNTER (OUTPATIENT)
Age: 42
Discharge: HOME | End: 2022-10-15
Payer: COMMERCIAL

## 2022-10-15 LAB
ALT SERPL-CCNC: 29 U/L (ref 5–41)
ANION GAP SERPL CALCULATED.3IONS-SCNC: 8 MMOL/L (ref 9–17)
AST SERPL-CCNC: 18 U/L
BUN BLDV-MCNC: 12 MG/DL (ref 6–20)
CALCIUM SERPL-MCNC: 9.4 MG/DL (ref 8.6–10.4)
CHLORIDE BLD-SCNC: 104 MMOL/L (ref 98–107)
CHOLESTEROL/HDL RATIO: 4.8
CHOLESTEROL: 163 MG/DL
CO2: 29 MMOL/L (ref 20–31)
CREAT SERPL-MCNC: 1 MG/DL (ref 0.7–1.2)
GFR SERPL CREATININE-BSD FRML MDRD: >60 ML/MIN/1.73M2
GLUCOSE BLD-MCNC: 92 MG/DL (ref 70–99)
HDLC SERPL-MCNC: 34 MG/DL
LDL CHOLESTEROL: 92 MG/DL (ref 0–130)
POTASSIUM SERPL-SCNC: 4.7 MMOL/L (ref 3.7–5.3)
SODIUM BLD-SCNC: 141 MMOL/L (ref 135–144)
TRIGL SERPL-MCNC: 184 MG/DL
VITAMIN D 25-HYDROXY: 19.9 NG/ML

## 2022-10-15 PROCEDURE — 80048 BASIC METABOLIC PNL TOTAL CA: CPT

## 2022-10-15 PROCEDURE — 36415 COLL VENOUS BLD VENIPUNCTURE: CPT

## 2022-10-15 PROCEDURE — 82306 VITAMIN D 25 HYDROXY: CPT

## 2022-10-15 PROCEDURE — 84450 TRANSFERASE (AST) (SGOT): CPT

## 2022-10-15 PROCEDURE — 84460 ALANINE AMINO (ALT) (SGPT): CPT

## 2022-10-15 PROCEDURE — 80061 LIPID PANEL: CPT

## 2022-10-15 PROCEDURE — 83036 HEMOGLOBIN GLYCOSYLATED A1C: CPT

## 2022-10-16 LAB
ESTIMATED AVERAGE GLUCOSE: 105 MG/DL
HBA1C MFR BLD: 5.3 % (ref 4–6)

## (undated) DEVICE — SUTURE ETHBND EXCEL SZ 0 L30IN NONABSORBABLE GRN CT1 L36MM X424H

## (undated) DEVICE — ARM DRAPE

## (undated) DEVICE — Z INACTIVE USE 2527070 DRAPE SURG W40XL44IN UNDERBUTTOCK SMS POLYPR W/ PCH BK DISP

## (undated) DEVICE — CANNULA NSL AD TBNG L7FT PVC STR NONFLARED PRNG O2 DEL W STD

## (undated) DEVICE — DRAPE,REIN 53X77,STERILE: Brand: MEDLINE

## (undated) DEVICE — GLOVE ORANGE PI 7   MSG9070

## (undated) DEVICE — DEFENDO AIR WATER SUCTION AND BIOPSY VALVE KIT FOR  OLYMPUS: Brand: DEFENDO AIR/WATER/SUCTION AND BIOPSY VALVE

## (undated) DEVICE — SINGLE ACTION PUMPING SYSTEM

## (undated) DEVICE — GOWN,AURORA,NONREINFORCED,LARGE: Brand: MEDLINE

## (undated) DEVICE — MHPB CYSTO PACK-LF: Brand: MEDLINE INDUSTRIES, INC.

## (undated) DEVICE — ADAPTER URO SCP UROLOK LL

## (undated) DEVICE — FORCEPS BX L240CM WRK CHN 2.8MM STD CAP W/ NDL MIC MESH

## (undated) DEVICE — GLOVE ORTHO 7 1/2   MSG9475

## (undated) DEVICE — MERCY HEALTH ST CHARLES: Brand: MEDLINE INDUSTRIES, INC.

## (undated) DEVICE — Z DUP USE 2552451 SUTURE ABSORBABLE ANTIBACT 0 CT-1 12 IN VIO PDS +

## (undated) DEVICE — Z DUPLICATE USE 2527422 TUBING O2 STD 7 FT EXTN NO CRUSH VISUAL CNTRST LF

## (undated) DEVICE — ENDO KIT W/SYRINGE: Brand: MEDLINE INDUSTRIES, INC.

## (undated) DEVICE — GUIDEWIRE URO L150CM DIA0.035IN STIFF NIT HYDRPHLC STR TIP

## (undated) DEVICE — BITEBLOCK 54FR W/ DENT RIM BLOX

## (undated) DEVICE — INSUFFLATION NEEDLE TO ESTABLISH PNEUMOPERITONEUM.: Brand: INSUFFLATION NEEDLE

## (undated) DEVICE — Z INACTIVE USE 2641839 CLIP INT M L POLYMER LOK LIG HEM O LOK

## (undated) DEVICE — Z DUP USE 2257490 ADHESIVE SKIN CLSRE 036ML TPCL 2CTL CNCRLTE HIGH VSCSTY DRMB

## (undated) DEVICE — JELLY,LUBE,STERILE,FLIP TOP,TUBE,2-OZ: Brand: MEDLINE

## (undated) DEVICE — COVER,TABLE,60X90,STERILE: Brand: MEDLINE

## (undated) DEVICE — JELLY LUBRICATING 4OZ FLIP TOP TB E Z

## (undated) DEVICE — 3M™ STERI-STRIP™ COMPOUND BENZOIN TINCTURE 40 BAGS/CARTON 4 CARTONS/CASE C1544: Brand: 3M™ STERI-STRIP™

## (undated) DEVICE — GOWN,POLY REINFORCED,LG: Brand: MEDLINE

## (undated) DEVICE — SUTURE MCRYL + SZ 4-0 L27IN ABSRB UD L19MM PS-2 3/8 CIR MCP426H

## (undated) DEVICE — SNARE ENDOSCP L240CM LOOP W13MM DIA2.4MM SHT THROW SM OVL

## (undated) DEVICE — STRIP,CLOSURE,WOUND,MEDI-STRIP,1/2X4: Brand: MEDLINE

## (undated) DEVICE — Z INACTIVE USE 2660664 SOLUTION IRRIG 3000ML 0.9% SOD CHL USP UROMATIC PLAS CONT

## (undated) DEVICE — Device

## (undated) DEVICE — Z DISCONTINUED USE 2424143 ADAPTER O2 SWVL CHRISTMAS TREE GRN

## (undated) DEVICE — CANNULA SEAL

## (undated) DEVICE — Z DISCONTINUED PER MEDLINE USE 2425483 TAPE UMB L30IN DIA1/8IN WHT COT NONABSORBABLE W/O NDL FOR

## (undated) DEVICE — TUBING, SUCTION, 3/16" X 10', STRAIGHT: Brand: MEDLINE

## (undated) DEVICE — Z INACTIVE USE 2653177 SPONGE GZ W2XL2IN NONWOVEN 4 PLY FASTER WICKING ABIL AVANT

## (undated) DEVICE — AIRLIFE™ NASAL OXYGEN CANNULA CURVED, FLARED TIP, WITH 7 FEET (2.1 M) CRUSH RESISTANT TUBING, OVER-THE-EAR STYLE: Brand: AIRLIFE™

## (undated) DEVICE — Z INACTIVE USE 2525529 CONNECTOR TBNG FOR O2

## (undated) DEVICE — BLADELESS OBTURATOR: Brand: WECK VISTA

## (undated) DEVICE — DUAL LUMEN URETERAL CATHETER

## (undated) DEVICE — GLOVE ORANGE PI 7 1/2   MSG9075

## (undated) DEVICE — FORCEPS BX L240CM JAW DIA2.4MM ORNG L CAP W/ NDL DISP RAD

## (undated) DEVICE — FIBER LSR DIA200UM FLEXSHIELD COAT HOLM HI PWR TRAC TIP

## (undated) DEVICE — SOLUTION IV IRRIG WATER 1000ML POUR BRL 2F7114

## (undated) DEVICE — BLANKET WRM W29.9XL79.1IN UP BODY FORC AIR MISTRAL-AIR

## (undated) DEVICE — ST CHARLES GEN LAPAROSCOPY PK: Brand: MEDLINE INDUSTRIES, INC.

## (undated) DEVICE — TOTAL TRAY, DB, 100% SILI FOLEY, 16FR 10: Brand: MEDLINE

## (undated) DEVICE — SYRINGE, LUER SLIP, 20ML: Brand: MEDLINE

## (undated) DEVICE — 20 ML SYRINGE REGULAR TIP: Brand: MONOJECT

## (undated) DEVICE — TROCAR: Brand: KII FIOS FIRST ENTRY

## (undated) DEVICE — COVER,MAYO STAND,XL,STERILE: Brand: MEDLINE

## (undated) DEVICE — BLADELESS OBTURATOR, LONG: Brand: WECK VISTA

## (undated) DEVICE — MARKER,SKIN,WI/RULER AND LABELS: Brand: MEDLINE

## (undated) DEVICE — PACK PROCEDURE SURG CYSTO SVMMC LF